# Patient Record
Sex: FEMALE | Race: WHITE | NOT HISPANIC OR LATINO | Employment: OTHER | URBAN - METROPOLITAN AREA
[De-identification: names, ages, dates, MRNs, and addresses within clinical notes are randomized per-mention and may not be internally consistent; named-entity substitution may affect disease eponyms.]

---

## 2021-03-19 ENCOUNTER — IMMUNIZATION (OUTPATIENT)
Dept: FAMILY MEDICINE | Facility: CLINIC | Age: 31
End: 2021-03-19

## 2021-03-19 DIAGNOSIS — Z23 NEED FOR VACCINATION: Primary | ICD-10-CM

## 2021-03-19 PROCEDURE — 91300 COVID-19, MRNA, LNP-S, PF, 30 MCG/0.3 ML DOSE VACCINE: CPT | Mod: ,,, | Performed by: FAMILY MEDICINE

## 2021-03-19 PROCEDURE — 0001A COVID-19, MRNA, LNP-S, PF, 30 MCG/0.3 ML DOSE VACCINE: ICD-10-PCS | Mod: CV19,,, | Performed by: FAMILY MEDICINE

## 2021-03-19 PROCEDURE — 91300 COVID-19, MRNA, LNP-S, PF, 30 MCG/0.3 ML DOSE VACCINE: ICD-10-PCS | Mod: ,,, | Performed by: FAMILY MEDICINE

## 2021-03-19 PROCEDURE — 0001A COVID-19, MRNA, LNP-S, PF, 30 MCG/0.3 ML DOSE VACCINE: CPT | Mod: CV19,,, | Performed by: FAMILY MEDICINE

## 2021-04-09 ENCOUNTER — IMMUNIZATION (OUTPATIENT)
Dept: FAMILY MEDICINE | Facility: CLINIC | Age: 31
End: 2021-04-09

## 2021-04-09 DIAGNOSIS — Z23 NEED FOR VACCINATION: Primary | ICD-10-CM

## 2021-04-09 PROCEDURE — 91300 COVID-19, MRNA, LNP-S, PF, 30 MCG/0.3 ML DOSE VACCINE: ICD-10-PCS | Mod: ,,, | Performed by: FAMILY MEDICINE

## 2021-04-09 PROCEDURE — 0002A COVID-19, MRNA, LNP-S, PF, 30 MCG/0.3 ML DOSE VACCINE: ICD-10-PCS | Mod: CV19,,, | Performed by: FAMILY MEDICINE

## 2021-04-09 PROCEDURE — 91300 COVID-19, MRNA, LNP-S, PF, 30 MCG/0.3 ML DOSE VACCINE: CPT | Mod: ,,, | Performed by: FAMILY MEDICINE

## 2021-04-09 PROCEDURE — 0002A COVID-19, MRNA, LNP-S, PF, 30 MCG/0.3 ML DOSE VACCINE: CPT | Mod: CV19,,, | Performed by: FAMILY MEDICINE

## 2021-09-17 PROBLEM — R11.0 NAUSEOUS: Status: ACTIVE | Noted: 2021-09-17

## 2023-01-11 PROBLEM — R13.14 PHARYNGOESOPHAGEAL DYSPHAGIA: Status: ACTIVE | Noted: 2023-01-11

## 2023-01-12 PROBLEM — F41.9 ANXIETY: Status: ACTIVE | Noted: 2023-01-12

## 2023-01-12 PROBLEM — F50.82 AVOIDANT-RESTRICTIVE FOOD INTAKE DISORDER (ARFID): Status: ACTIVE | Noted: 2023-01-12

## 2023-01-12 PROBLEM — R11.2 NAUSEA AND VOMITING: Status: ACTIVE | Noted: 2021-09-17

## 2023-01-12 PROBLEM — E07.9 THYROID MASS: Status: ACTIVE | Noted: 2023-01-12

## 2023-01-12 PROBLEM — D64.9 ANEMIA: Status: ACTIVE | Noted: 2023-01-12

## 2023-01-13 PROBLEM — D50.9 MICROCYTIC ANEMIA: Status: ACTIVE | Noted: 2023-01-12

## 2023-02-01 PROBLEM — R59.1 LYMPHADENOPATHY: Status: ACTIVE | Noted: 2023-02-01

## 2023-02-01 PROBLEM — C73: Status: ACTIVE | Noted: 2023-02-01

## 2023-02-07 ENCOUNTER — TELEPHONE (OUTPATIENT)
Dept: ENDOCRINOLOGY | Facility: CLINIC | Age: 33
End: 2023-02-07

## 2023-02-07 NOTE — TELEPHONE ENCOUNTER
Pt had surgery on 2/1  Referring [facility] Physician wants 2 week s/p thyroidectomy with Endocrinology  Advised Pauline, , that your next new pt appt available to her is Aug  They will be looking on [place] and provided Pauline with Resource number as well to assist with Medicaid new pt appts and placed on waiting list    Pauline will cont to search for sooner 2 week f/u but asking for guidance if cannot find sooner appt

## 2023-02-07 NOTE — TELEPHONE ENCOUNTER
----- Message from Cris Pichardo sent at 2/7/2023  9:22 AM CST -----  Contact: Pauline rogel/ENZO  Patient needs a [facility] 2 wk f/u appt call back Pauline at [phone] or the patient at [phone] and thanks

## 2023-02-22 ENCOUNTER — OFFICE VISIT (OUTPATIENT)
Dept: ENDOCRINOLOGY | Facility: CLINIC | Age: 33
End: 2023-02-22
Payer: MEDICAID

## 2023-02-22 VITALS
DIASTOLIC BLOOD PRESSURE: 64 MMHG | BODY MASS INDEX: 24.47 KG/M2 | WEIGHT: 155.88 LBS | HEART RATE: 98 BPM | SYSTOLIC BLOOD PRESSURE: 110 MMHG | HEIGHT: 67 IN | OXYGEN SATURATION: 99 %

## 2023-02-22 DIAGNOSIS — E03.9 HYPOTHYROIDISM, UNSPECIFIED TYPE: ICD-10-CM

## 2023-02-22 DIAGNOSIS — C73 THYROID CANCER: Primary | ICD-10-CM

## 2023-02-22 DIAGNOSIS — E83.51 HYPOCALCEMIA: ICD-10-CM

## 2023-02-22 PROCEDURE — 1159F MED LIST DOCD IN RCRD: CPT | Mod: CPTII,,, | Performed by: INTERNAL MEDICINE

## 2023-02-22 PROCEDURE — 3008F PR BODY MASS INDEX (BMI) DOCUMENTED: ICD-10-PCS | Mod: CPTII,,, | Performed by: INTERNAL MEDICINE

## 2023-02-22 PROCEDURE — 99204 OFFICE O/P NEW MOD 45 MIN: CPT | Mod: S$PBB,,, | Performed by: INTERNAL MEDICINE

## 2023-02-22 PROCEDURE — 1111F DSCHRG MED/CURRENT MED MERGE: CPT | Mod: CPTII,,, | Performed by: INTERNAL MEDICINE

## 2023-02-22 PROCEDURE — 99214 OFFICE O/P EST MOD 30 MIN: CPT | Mod: PBBFAC,PO | Performed by: INTERNAL MEDICINE

## 2023-02-22 PROCEDURE — 3078F DIAST BP <80 MM HG: CPT | Mod: CPTII,,, | Performed by: INTERNAL MEDICINE

## 2023-02-22 PROCEDURE — 3074F SYST BP LT 130 MM HG: CPT | Mod: CPTII,,, | Performed by: INTERNAL MEDICINE

## 2023-02-22 PROCEDURE — 1160F PR REVIEW ALL MEDS BY PRESCRIBER/CLIN PHARMACIST DOCUMENTED: ICD-10-PCS | Mod: CPTII,,, | Performed by: INTERNAL MEDICINE

## 2023-02-22 PROCEDURE — 3008F BODY MASS INDEX DOCD: CPT | Mod: CPTII,,, | Performed by: INTERNAL MEDICINE

## 2023-02-22 PROCEDURE — 99999 PR PBB SHADOW E&M-EST. PATIENT-LVL IV: CPT | Mod: PBBFAC,,, | Performed by: INTERNAL MEDICINE

## 2023-02-22 PROCEDURE — 3078F PR MOST RECENT DIASTOLIC BLOOD PRESSURE < 80 MM HG: ICD-10-PCS | Mod: CPTII,,, | Performed by: INTERNAL MEDICINE

## 2023-02-22 PROCEDURE — 1111F PR DISCHARGE MEDS RECONCILED W/ CURRENT OUTPATIENT MED LIST: ICD-10-PCS | Mod: CPTII,,, | Performed by: INTERNAL MEDICINE

## 2023-02-22 PROCEDURE — 1159F PR MEDICATION LIST DOCUMENTED IN MEDICAL RECORD: ICD-10-PCS | Mod: CPTII,,, | Performed by: INTERNAL MEDICINE

## 2023-02-22 PROCEDURE — 99999 PR PBB SHADOW E&M-EST. PATIENT-LVL IV: ICD-10-PCS | Mod: PBBFAC,,, | Performed by: INTERNAL MEDICINE

## 2023-02-22 PROCEDURE — 99204 PR OFFICE/OUTPT VISIT, NEW, LEVL IV, 45-59 MIN: ICD-10-PCS | Mod: S$PBB,,, | Performed by: INTERNAL MEDICINE

## 2023-02-22 PROCEDURE — 3074F PR MOST RECENT SYSTOLIC BLOOD PRESSURE < 130 MM HG: ICD-10-PCS | Mod: CPTII,,, | Performed by: INTERNAL MEDICINE

## 2023-02-22 PROCEDURE — 1160F RVW MEDS BY RX/DR IN RCRD: CPT | Mod: CPTII,,, | Performed by: INTERNAL MEDICINE

## 2023-02-22 NOTE — PROGRESS NOTES
CHIEF COMPLAINT:  Papillary thyroid cancer  32 y.o. old being seen as a new patient.  Here with mother.  Status post thyroidectomy.  Currently on Synthroid 125 mcg daily.  Currently on Tums 1 g 3 times daily.  Also on calcitriol 0.5 mcg b.i.d..  Had a CT showing multiple lymph nodes.  Patient had FNA suggestive of Hurthle cell neoplasm.  Subsequently had a thyroidectomy on 02/01/2023. She did have LN involvement and strap muscle muscle involvement. States that has difficulty swallowing. Having to crush synthroid and place in a little bit of apple sauce. States that had an EGD with dilation. On liquid calcitriol. Taking LT4 by itself. No XRt to head/neck. No palpitations. No tremors. She has a history of panic attacks. No increase from before. No cramping. Tums decreased based on 2/17/23 labs.       FNA 01/18/2023  RIGHT NECK FINE NEEDLE ASPIRATE   - BLOOD ONLY.     RIGHT THYROID FINE NEEDLE ASPIRATE:     - SUSPICIOUS FOR A FOLLICULAR NEOPLASM:  HURTHLE CELL TYPE.         1.  THYROID GLAND, RIGHT LOBE, LOBECTOMY:        --PAPILLARY THYROID CARCINOMA, CLASSIC (USUAL, CONVENTIONAL) TYPE.        --TUMOR MEASURES 1.6 CENTIMETERS.        --FOCAL LYMPHATIC INVASION IDENTIFIED.        --EXTRATHYROIDAL EXTENSION IDENTIFIED.        --MARGINS ARE NEGATIVE FOR MALIGNANCY.   --BENIGN SUBCAPSULAR/INTRACAPSULAR PARATHYROID GLANDULAR TISSUE    IDENTIFIED.          2.  THYROID GLAND, LEFT LOBE, LOBECTOMY:        --PAPILLARY THYROID CARCINOMA, CLASSIC (USUAL, CONVENTIONAL) TYPE.        --TUMOR MEASURES 1.2 CENTIMETERS.        --FOCAL LYMPHATIC INVASION IDENTIFIED.        --NO EXTRATHYROIDAL EXTENSION IDENTIFIED.        --MARGINS ARE NEGATIVE FOR MALIGNANCY.   --ONE PERITHYROIDAL SPECIMEN LYMPH NODE IS POSITIVE FOR METASTATIC    PAPILLARY THYROID CARCINOMA   (1/1); SIZE OF PART 1 SPECIMEN METASTATIC DEPOSIT: APPROXIMATELY 0.1    CENTIMETER; NO UNEQUIVOCAL           EXTRANODAL EXTENSION IDENTIFIED.     3.  LEFT CENTRAL NECK,  EXCISION:   --NINE OUT OF TWELVE LYMPH NODES ARE POSITIVE FOR METASTATIC PAPILLARY    THYROID CARCINOMA (9/12).   --SIZE OF LARGEST PART 3 SPECIMEN METASTATIC DEPOSIT: APPROXIMATELY 0.6    CENTIMETER.        --FOCAL EXTRANODAL EXTENSION IDENTIFIED.        --THYROID PARENCHYMA WITH NODULAR HYPERPLASIA.        --BENIGN PARATHYROID GLANDULAR TISSUE IDENTIFIED.     4.  RIGHT PARATRACHEAL, EXCISION:   --SIX OUT OF TEN LYMPH NODES ARE POSITIVE FOR METASTATIC PAPILLARY    THYROID CARCINOMA (6/10).   --SIZE OF LARGEST PART 4 SPECIMEN METASTATIC DEPOSIT: APPROXIMATELY 0.7    CENTIMETER.        --NO UNEQUIVOCAL EXTRANODAL EXTENSION IDENTIFIED.        --BENIGN ECTOPIC THYMIC TISSUE IDENTIFIED.     5.  RIGHT NECK CONTENTS, DISSECTION:   --THIRTEEN OUT OF FORTY LYMPH NODES ARE POSITIVE FOR METASTATIC    PAPILLARY THYROID CARCINOMA           (13/40).   --SIZE OF LARGEST PART 5 SPECIMEN METASTATIC DEPOSIT: APPROXIMATELY 1.7    CENTIMETERS.        --EXTRANODAL EXTENSION (MULTIFOCAL) IDENTIFIED.     6.  LEFT NECK CONTENTS, DISSECTION:   --ONE OUT OF EIGHTEEN LYMPH NODES IS POSITIVE FOR METASTATIC PAPILLARY    THYROID CARCINOMA (1/18).   --SIZE OF LARGEST PART 6 SPECIMEN METASTATIC DEPOSIT: LESS THAN 0.1    CENTIMETER.        --NO UNEQUIVOCAL EXTRANODAL EXTENSION IDENTIFIED.        --BENIGN PARATHYROID GLANDULAR TISSUE IDENTIFIED.       PAST MEDICAL HISTORY/PAST SURGICAL HISTORY:  Reviewed in [facility]    SOCIAL HISTORY: Reviewed in [facility]    FAMILY HISTORY:  Father has thyroid nodules and awaiting biopsy. Father with prediabetes.     MEDICATIONS/ALLERGIES: The patient's MedCard has been updated and reviewed.            PE:    GENERAL: Well developed, well nourished.  NECK: Supple, trachea midline, surgical scar intact.  CHEST: Resp even and unlabored, CTA bilateral.  CARDIAC: RRR, S1, S2 heard, no murmurs, rubs, S3, or S4  SKIN: no acanthosis nigracans.    LABS/Radiology     Latest Reference Range & Units 02/09/23 06:17 02/14/23 14:38  02/17/23 15:22   Sodium 136 - 145 mmol/L 137 139    Potassium 3.5 - 5.1 mmol/L 3.7 4.3    Chloride 95 - 110 mmol/L 102 102    CO2 22 - 31 mmol/L 31 30    Anion Gap 8 - 16 mmol/L 4 (L) 7 (L)    BUN 7 - 18 mg/dL 5 (L) 12    Creatinine 0.50 - 1.40 mg/dL 0.80 0.88    eGFR >60 mL/min/1.73 m^2 >60 >60    Glucose 70 - 110 mg/dL 99 104    Calcium 8.4 - 10.2 mg/dL  8.4 - 10.2 mg/dL 8.7 10.2  10.2    Ionized Calcium 1.11 - 1.30 mmol/L   1.34 (H)   CALCIUM, IONIZED AT PH 7.4 SER 1.11 - 1.30 mmol/L   1.39 (H)   Magnesium 1.6 - 2.6 mg/dL  1.9    Alkaline Phosphatase 38 - 145 U/L  83    PROTEIN TOTAL 6.0 - 8.4 g/dL  6.8    Albumin 3.5 - 5.2 g/dL  4.2    (L): Data is abnormally low  (H): Data is abnormally high     Latest Reference Range & Units 02/08/23 07:53 02/14/23 14:38   PTH 9.0 - 77.0 pg/mL <3.4 (L) <3.4 (L)   (L): Data is abnormally low    ASSESSMENT/PLAN:  Papillary thyroid cancer-multifocal.  Multiple level lymph node involvement.  Also involvement of strap muscle.  Status post thyroidectomy with bilateral neck dissection.  Reviewed pathology report with patient.  Discussed usual treatment for thyroid cancer versus other types of cancers.  She will need radioactive iodine.  Will assess for dosage of radioactive iodine after we check her thyroglobulin in 2 weeks.  May need higher dose due to multiple level lymph node involvement as well as strap muscle involvement.  Will keep TSH suppressed.  However, she does have anxiety and will need to watch for worsening of anxiety.  Discussed taking thyroid medication by itself.  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  States she is seeing GI.  Taking L T4 with some small amount of applesauce.    2.  Hypothyroidism-see #1.  Monitor closely for hyperthyroid symptoms since will be keeping TSH suppressed.    3. Hypocalcemia-last PTH undetectable.  Repeat in 2 weeks.  Currently on liquid calcitriol.  Last calcium was elevated and Tums decreased.  No symptoms of  hypocalcemia.      FOLLOWUP  2 weeks- TSH, Tg, Renal Panel, PTH  F/U 6 months   Info on low iodine

## 2023-03-03 ENCOUNTER — PATIENT MESSAGE (OUTPATIENT)
Dept: ENDOCRINOLOGY | Facility: CLINIC | Age: 33
End: 2023-03-03

## 2023-03-07 ENCOUNTER — PATIENT MESSAGE (OUTPATIENT)
Dept: ENDOCRINOLOGY | Facility: CLINIC | Age: 33
End: 2023-03-07

## 2023-03-07 DIAGNOSIS — N28.9 KIDNEY FUNCTION ABNORMAL: Primary | ICD-10-CM

## 2023-03-07 RX ORDER — CALCIUM CARBONATE 200(500)MG
1 TABLET,CHEWABLE ORAL 2 TIMES DAILY
Qty: 60 TABLET | Refills: 11 | COMMUNITY
Start: 2023-03-07 | End: 2024-03-06

## 2023-03-07 NOTE — TELEPHONE ENCOUNTER
Last Ca was elevated  Decrease Tums to 1 tablet BID  Stay on calcitriol  Check Renal Panel 1 week    Will discuss with nuclear med on LEYVA dose

## 2023-03-14 ENCOUNTER — PATIENT MESSAGE (OUTPATIENT)
Dept: ENDOCRINOLOGY | Facility: CLINIC | Age: 33
End: 2023-03-14

## 2023-03-14 DIAGNOSIS — E83.51 HYPOCALCEMIA: Primary | ICD-10-CM

## 2023-03-30 ENCOUNTER — TELEPHONE (OUTPATIENT)
Dept: ENDOCRINOLOGY | Facility: CLINIC | Age: 33
End: 2023-03-30

## 2023-03-31 NOTE — TELEPHONE ENCOUNTER
Can you see if she is still taking in anything orally.    Need to see how we can get radioactive iodine.

## 2023-04-05 ENCOUNTER — TELEPHONE (OUTPATIENT)
Dept: ENDOCRINOLOGY | Facility: CLINIC | Age: 33
End: 2023-04-05

## 2023-04-05 NOTE — TELEPHONE ENCOUNTER
----- Message from Kailee Trejo sent at 4/5/2023 11:20 AM CDT -----  Regarding: pt call back  Type:  Patient Returning Call         Who Called: Mother of pt          Who Left Message for Patient: Millie Kincaid LPN         Does the patient know what this is regarding? Results          Best Call Back Number: [phone]           Additional Information:

## 2023-04-10 ENCOUNTER — TELEPHONE (OUTPATIENT)
Dept: ENDOCRINOLOGY | Facility: CLINIC | Age: 33
End: 2023-04-10

## 2023-04-10 DIAGNOSIS — E83.51 HYPOCALCEMIA: Primary | ICD-10-CM

## 2023-04-10 NOTE — TELEPHONE ENCOUNTER
----- Message from Selena Rondon sent at 4/10/2023 12:06 PM CDT -----  Name of Who is Calling:JAVED MOSES [[id number]], Gio , mother        What is the request in detail: pt had blood work done last week and mother has not heard back from anyone yet, stated that she has been waiting to know when she will be starting radiation, would like to know if pt should stay on same dosage of medication due to no call back        Can the clinic reply by [facility]: no        [facility] [phone]

## 2023-04-11 NOTE — TELEPHONE ENCOUNTER
Was finally able to reach mother, advised of your message below.  She verb understanding.  Will repeat labs in 2 weeks at [facility].      She says pt will only take pills/meds crushed and mixed in apple sauce or yogurt.  Please advise.

## 2023-04-11 NOTE — TELEPHONE ENCOUNTER
Thyroglobulin not significantly elevated.  Would ideally like to do radioactive iodine.  Ideally should be given orally.  Can you see if she is taking any medications orally     Calcium currently within normal limits with medication.  Stay on same doses    In 2 weeks check a renal panel and PTH

## 2023-04-19 NOTE — TELEPHONE ENCOUNTER
"Please see her mother's msg from last week.  "She says pt will only take pills/meds crushed and mixed in apple sauce or yogurt".  Please advise.   "

## 2023-04-19 NOTE — TELEPHONE ENCOUNTER
"Spoke w/ pt's mother, she has only been able to swallow a "tiny nexium capsule" twice since the weekend, cannot swallow the levothyroxine.  Pt is trying and really wants to be able to move forward with the LEYVA but they feel she will need "at least another week of practicing swallowing."  Please advise.   "

## 2023-04-27 ENCOUNTER — PATIENT MESSAGE (OUTPATIENT)
Dept: ENDOCRINOLOGY | Facility: CLINIC | Age: 33
End: 2023-04-27

## 2023-04-28 ENCOUNTER — PATIENT MESSAGE (OUTPATIENT)
Dept: ENDOCRINOLOGY | Facility: CLINIC | Age: 33
End: 2023-04-28

## 2023-04-28 RX ORDER — CALCITRIOL 1 UG/ML
1 SOLUTION ORAL DAILY
COMMUNITY
Start: 2023-03-22 | End: 2023-04-28 | Stop reason: SDUPTHER

## 2023-04-28 RX ORDER — CALCITRIOL 1 UG/ML
1 SOLUTION ORAL DAILY
Qty: 30 ML | Refills: 11 | Status: SHIPPED | OUTPATIENT
Start: 2023-04-28 | End: 2023-05-09 | Stop reason: SDUPTHER

## 2023-05-04 ENCOUNTER — TELEPHONE (OUTPATIENT)
Dept: ENDOCRINOLOGY | Facility: CLINIC | Age: 33
End: 2023-05-04
Payer: MEDICAID

## 2023-05-04 NOTE — TELEPHONE ENCOUNTER
----- Message from Rafa Jordan sent at 5/4/2023  9:46 AM CDT -----  Type: Needs Medical Advice  Who Called:  Pt's mother Gio  Best Call Back Number: [phone]  Additional Information: pt's mother stated she is concerned that the pt's ins end June 1st and the pt needs her radiation. She would like to discuss her concerns with someone in the office asap. Please call back to advise asap, thanks!

## 2023-05-04 NOTE — TELEPHONE ENCOUNTER
LM returning pt's mother's call and adv we are still waiting to hear back about RA and Dr Cardenas is not in clinic until Mon.

## 2023-05-09 RX ORDER — CALCITRIOL 1 UG/ML
1 SOLUTION ORAL DAILY
Qty: 30 ML | Refills: 11 | Status: SHIPPED | OUTPATIENT
Start: 2023-05-09 | End: 2024-02-19

## 2023-05-16 ENCOUNTER — PATIENT MESSAGE (OUTPATIENT)
Dept: ENDOCRINOLOGY | Facility: CLINIC | Age: 33
End: 2023-05-16
Payer: MEDICAID

## 2023-05-18 ENCOUNTER — PATIENT MESSAGE (OUTPATIENT)
Dept: ENDOCRINOLOGY | Facility: CLINIC | Age: 33
End: 2023-05-18
Payer: MEDICAID

## 2023-05-31 ENCOUNTER — OFFICE VISIT (OUTPATIENT)
Dept: ENDOCRINOLOGY | Facility: CLINIC | Age: 33
End: 2023-05-31
Payer: MEDICAID

## 2023-05-31 VITALS
HEIGHT: 67 IN | SYSTOLIC BLOOD PRESSURE: 128 MMHG | BODY MASS INDEX: 25.17 KG/M2 | DIASTOLIC BLOOD PRESSURE: 86 MMHG | WEIGHT: 160.38 LBS

## 2023-05-31 DIAGNOSIS — C73 PRIMARY THYROID MALIGNANCY: Primary | ICD-10-CM

## 2023-05-31 PROCEDURE — 99214 PR OFFICE/OUTPT VISIT, EST, LEVL IV, 30-39 MIN: ICD-10-PCS | Mod: S$PBB,,, | Performed by: INTERNAL MEDICINE

## 2023-05-31 PROCEDURE — 99999 PR PBB SHADOW E&M-EST. PATIENT-LVL III: CPT | Mod: PBBFAC,,, | Performed by: INTERNAL MEDICINE

## 2023-05-31 PROCEDURE — 99213 OFFICE O/P EST LOW 20 MIN: CPT | Mod: PBBFAC | Performed by: INTERNAL MEDICINE

## 2023-05-31 PROCEDURE — 99999 PR PBB SHADOW E&M-EST. PATIENT-LVL III: ICD-10-PCS | Mod: PBBFAC,,, | Performed by: INTERNAL MEDICINE

## 2023-05-31 PROCEDURE — 99214 OFFICE O/P EST MOD 30 MIN: CPT | Mod: S$PBB,,, | Performed by: INTERNAL MEDICINE

## 2023-05-31 NOTE — PROGRESS NOTES
Ms. Johnston is a 32 year-old female with multifocal bilateral papillary thyroid cancer (classic variant with ETE). She underwent total thyroidectomy and  bilateral central and lateral neck dissection with Dr. Weaver on 2/1/2023.  30/81 LN positive with MYRIAM present. LÓPEZ high risk for recurrence.       She is accompanied by her parents for this consultation.     Informed consent for treatment was obtained verbally.  The indication for, risks and benefits of, and alternatives to, treatment with radioactive iodine were reviewed with the patient.  Specifically, the risks for nausea and vomiting, temporary or permanent injury to the salivary glands, and secondary malignancies such as leukemia were discussed.  All questions were answered to her satisfaction, and she would like to proceed with treatment.     The importance of preparation with the low iodine diet was reviewed, and the principles of radiation safety precaution were also reviewed.      She has no special medical needs, and her home and travel situations are appropriate.     Impression/Plan:  Ms. Johnston is a year-old woman with papillary thyroid cancer, and she is an appropriate candidate for [post-surgical adjuvant treatment of thyroid cancer with I-131 on an outpatient basis.       Given the status/extent of her disease, an administered activity of 100 mCi of I-131 would be appropriate.  Written radiation safety precautions will be provided, and the informed consent form can be signed on the date of treatment.     A working schedule is as follows:    Wednesday, date:  Begin low iodine diet  Monday, date: Receive 1st Thyrogen injection at Dr. Cardenas's office followed by serum HCG  Tuesday, date: Receive 2nd Thyrogen injection followed by TSH and thyroglobulin  Wednesday, date: Treatment with 100 mCi I-131 at [facility]  Saturday, date:  Resume usual diet  Wednesday, 1 week later: Undergo post-treatment whole body scan at Nuclear Medicine    I  will contact her with finalized dates. Will plan to have her do labs and Thyrogen with Dr. Cardenas's office.       Checklist:  [x]    Written Directive - to be completed when patient arrives for treatment  [x]    Screening worksheet for outpatient treatment  [x]    Patient radiation safety instructions  [x]    Record of patient release (<220 mCi for thyroidectomy patients)  [x]    Informed consent  [x]    Pregnancy and breast feeding screen (order serum hCG within 48 hours of treatment if female <55 years old and without hysterectomy)  [x]    Low iodine diet information provided      A total of 55 minutes was spent on this visit, which includes: preparing to see the patient, obtaining history, performing a medically appropriate exam, counseling, ordering medications, tests, and/or procedures, and documenting the clinical information in the EHR.                   Matthew Coker M.D. Staff Endocrinology

## 2023-06-05 ENCOUNTER — TELEPHONE (OUTPATIENT)
Dept: ENDOCRINOLOGY | Facility: CLINIC | Age: 33
End: 2023-06-05
Payer: COMMERCIAL

## 2023-06-05 DIAGNOSIS — C73 THYROID CANCER: Primary | ICD-10-CM

## 2023-06-05 DIAGNOSIS — C73 PRIMARY THYROID MALIGNANCY: Primary | ICD-10-CM

## 2023-06-05 NOTE — TELEPHONE ENCOUNTER
----- Message from Matthew Coker MD sent at 6/5/2023  1:00 PM CDT -----  Regarding: RE: LEYVA treatment  Alan, is the size of the regular LEYVA capsule roughly equivalent to an ibuprofen liquid capsule? Her mom says she was able to swallow that. I just want to make sure there aren't any surprises on the treatment day.   ----- Message -----  From: Abhinav Andrews RT  Sent: 6/5/2023  12:59 PM CDT  To: Haider Cardenas DO, Matthew Coker MD, #  Subject: RE: LEYVA treatment                                Patient has been scheduled for Treatment and one week post WB scan. Dose has been ordered, we are unable to order form [facility] (as per hospital pharmacy)..  ----- Message -----  From: Matthew Coker MD  Sent: 6/5/2023   7:15 AM CDT  To: Haider Cardenas DO, Abhinav Andrews, RT, #  Subject: LEYVA treatment                                    Hello,     I would like to treat Ms. Johnston with LEYVA on Wed June 21st at 10 or 10:30 AM. She would like to get Thyrogen and her pretreatment labs on the [facility] if possible.     Her schedule should look like this (I'll put all the orders in)    Thyrogen 1 and Serum HCG June 19th ([facility])  Thyrogen 2 and TSH/thyroglobulin after second Thyrogen June 20th ([facility])  100 mCi of LEYVA June 21st ([facility])  Whole body scan June 28th ([facility])      Dee Dee, can you let me know when her treatment and whole body scan are scheduled. Also, Dr. Mir had mentioned possibly ordering her LEYVA from [facility] so she can have a smaller pill to swallow.     Thanks,   Matthew

## 2023-06-07 ENCOUNTER — PATIENT MESSAGE (OUTPATIENT)
Dept: ENDOCRINOLOGY | Facility: CLINIC | Age: 33
End: 2023-06-07
Payer: COMMERCIAL

## 2023-06-08 NOTE — TELEPHONE ENCOUNTER
Called patient's mother. She had questions about any anticipated side effects after LEYVA that could cause significant issue or cause her to have to bring her daughter to the ER. I advised her that I don't anticipate any side effects that would require an ER visit or urgent intervention. Advised her the most likely side effect would be salivary gland tenderness which could be managed with NSAID's, Tylenol and oral hydration and should not require an ER visit. Some nausea or GI upset is also possible and should be treated at home with anti-emetics and oral hydration.

## 2023-06-19 ENCOUNTER — LAB VISIT (OUTPATIENT)
Dept: LAB | Facility: HOSPITAL | Age: 33
End: 2023-06-19
Attending: INTERNAL MEDICINE
Payer: COMMERCIAL

## 2023-06-19 ENCOUNTER — CLINICAL SUPPORT (OUTPATIENT)
Dept: ENDOCRINOLOGY | Facility: CLINIC | Age: 33
End: 2023-06-19
Payer: COMMERCIAL

## 2023-06-19 DIAGNOSIS — C73 PRIMARY THYROID MALIGNANCY: ICD-10-CM

## 2023-06-19 DIAGNOSIS — C73 THYROID CANCER: Primary | ICD-10-CM

## 2023-06-19 LAB — HCG INTACT+B SERPL-ACNC: <2.4 MIU/ML

## 2023-06-19 PROCEDURE — 84702 CHORIONIC GONADOTROPIN TEST: CPT | Performed by: INTERNAL MEDICINE

## 2023-06-19 PROCEDURE — 36415 COLL VENOUS BLD VENIPUNCTURE: CPT | Mod: PO | Performed by: INTERNAL MEDICINE

## 2023-06-19 PROCEDURE — 99211 OFF/OP EST MAY X REQ PHY/QHP: CPT | Mod: PBBFAC,PO

## 2023-06-19 PROCEDURE — 99999 PR PBB SHADOW E&M-EST. PATIENT-LVL I: ICD-10-PCS | Mod: PBBFAC,,,

## 2023-06-19 PROCEDURE — 96372 PR INJECTION,THERAP/PROPH/DIAG2ST, IM OR SUBCUT: ICD-10-PCS | Mod: S$GLB,,, | Performed by: INTERNAL MEDICINE

## 2023-06-19 PROCEDURE — 96372 THER/PROPH/DIAG INJ SC/IM: CPT | Mod: S$GLB,,, | Performed by: INTERNAL MEDICINE

## 2023-06-19 PROCEDURE — 99999 PR PBB SHADOW E&M-EST. PATIENT-LVL I: CPT | Mod: PBBFAC,,,

## 2023-06-20 ENCOUNTER — CLINICAL SUPPORT (OUTPATIENT)
Dept: ENDOCRINOLOGY | Facility: CLINIC | Age: 33
End: 2023-06-20
Payer: COMMERCIAL

## 2023-06-20 ENCOUNTER — LAB VISIT (OUTPATIENT)
Dept: LAB | Facility: HOSPITAL | Age: 33
End: 2023-06-20
Attending: INTERNAL MEDICINE
Payer: COMMERCIAL

## 2023-06-20 DIAGNOSIS — C73 PRIMARY THYROID MALIGNANCY: Primary | ICD-10-CM

## 2023-06-20 DIAGNOSIS — C73 PRIMARY THYROID MALIGNANCY: ICD-10-CM

## 2023-06-20 DIAGNOSIS — D50.9 MICROCYTIC ANEMIA: Primary | ICD-10-CM

## 2023-06-20 DIAGNOSIS — D50.9 MICROCYTIC ANEMIA: ICD-10-CM

## 2023-06-20 LAB
BASOPHILS # BLD AUTO: 0.03 K/UL (ref 0–0.2)
BASOPHILS NFR BLD: 0.7 % (ref 0–1.9)
DIFFERENTIAL METHOD: ABNORMAL
EOSINOPHIL # BLD AUTO: 0.1 K/UL (ref 0–0.5)
EOSINOPHIL NFR BLD: 1.8 % (ref 0–8)
ERYTHROCYTE [DISTWIDTH] IN BLOOD BY AUTOMATED COUNT: 12.4 % (ref 11.5–14.5)
HCT VFR BLD AUTO: 35.3 % (ref 37–48.5)
HGB BLD-MCNC: 11.7 G/DL (ref 12–16)
IMM GRANULOCYTES # BLD AUTO: 0.01 K/UL (ref 0–0.04)
IMM GRANULOCYTES NFR BLD AUTO: 0.2 % (ref 0–0.5)
LYMPHOCYTES # BLD AUTO: 1.5 K/UL (ref 1–4.8)
LYMPHOCYTES NFR BLD: 33.9 % (ref 18–48)
MCH RBC QN AUTO: 28.5 PG (ref 27–31)
MCHC RBC AUTO-ENTMCNC: 33.1 G/DL (ref 32–36)
MCV RBC AUTO: 86 FL (ref 82–98)
MONOCYTES # BLD AUTO: 0.4 K/UL (ref 0.3–1)
MONOCYTES NFR BLD: 8 % (ref 4–15)
NEUTROPHILS # BLD AUTO: 2.4 K/UL (ref 1.8–7.7)
NEUTROPHILS NFR BLD: 55.4 % (ref 38–73)
NRBC BLD-RTO: 0 /100 WBC
PLATELET # BLD AUTO: 280 K/UL (ref 150–450)
PMV BLD AUTO: 10.3 FL (ref 9.2–12.9)
RBC # BLD AUTO: 4.11 M/UL (ref 4–5.4)
T4 FREE SERPL-MCNC: 1.54 NG/DL (ref 0.71–1.51)
TSH SERPL DL<=0.005 MIU/L-ACNC: 156.31 UIU/ML (ref 0.4–4)
WBC # BLD AUTO: 4.4 K/UL (ref 3.9–12.7)

## 2023-06-20 PROCEDURE — 84432 ASSAY OF THYROGLOBULIN: CPT | Performed by: INTERNAL MEDICINE

## 2023-06-20 PROCEDURE — 84439 ASSAY OF FREE THYROXINE: CPT | Performed by: INTERNAL MEDICINE

## 2023-06-20 PROCEDURE — 96372 PR INJECTION,THERAP/PROPH/DIAG2ST, IM OR SUBCUT: ICD-10-PCS | Mod: S$GLB,,, | Performed by: INTERNAL MEDICINE

## 2023-06-20 PROCEDURE — 85025 COMPLETE CBC W/AUTO DIFF WBC: CPT | Performed by: INTERNAL MEDICINE

## 2023-06-20 PROCEDURE — 36415 COLL VENOUS BLD VENIPUNCTURE: CPT | Mod: PO | Performed by: INTERNAL MEDICINE

## 2023-06-20 PROCEDURE — 84443 ASSAY THYROID STIM HORMONE: CPT | Performed by: INTERNAL MEDICINE

## 2023-06-20 PROCEDURE — 96372 THER/PROPH/DIAG INJ SC/IM: CPT | Mod: S$GLB,,, | Performed by: INTERNAL MEDICINE

## 2023-06-21 ENCOUNTER — HOSPITAL ENCOUNTER (OUTPATIENT)
Dept: RADIOLOGY | Facility: HOSPITAL | Age: 33
Discharge: HOME | End: 2023-06-21
Attending: INTERNAL MEDICINE
Payer: COMMERCIAL

## 2023-06-21 ENCOUNTER — PATIENT MESSAGE (OUTPATIENT)
Dept: ENDOCRINOLOGY | Facility: CLINIC | Age: 33
End: 2023-06-21
Payer: COMMERCIAL

## 2023-06-21 DIAGNOSIS — C73 PRIMARY THYROID MALIGNANCY: ICD-10-CM

## 2023-06-21 PROCEDURE — 79005 NM THERAPY BY ORAL ADMINISTRATION: ICD-10-PCS | Mod: 26,,, | Performed by: INTERNAL MEDICINE

## 2023-06-21 PROCEDURE — 79005 NUCLEAR RX ORAL ADMIN: CPT | Mod: TC

## 2023-06-21 PROCEDURE — 79005 NUCLEAR RX ORAL ADMIN: CPT | Mod: 26,,, | Performed by: INTERNAL MEDICINE

## 2023-06-22 ENCOUNTER — PATIENT MESSAGE (OUTPATIENT)
Dept: ENDOCRINOLOGY | Facility: CLINIC | Age: 33
End: 2023-06-22
Payer: COMMERCIAL

## 2023-06-22 LAB
THRYOGLOBULIN INTERPRETATION: ABNORMAL
THYROGLOB AB SERPL-ACNC: <1.8 IU/ML
THYROGLOB SERPL-MCNC: 0.9 NG/ML

## 2023-06-27 ENCOUNTER — PATIENT MESSAGE (OUTPATIENT)
Dept: ENDOCRINOLOGY | Facility: CLINIC | Age: 33
End: 2023-06-27
Payer: COMMERCIAL

## 2023-06-28 ENCOUNTER — HOSPITAL ENCOUNTER (OUTPATIENT)
Dept: RADIOLOGY | Facility: HOSPITAL | Age: 33
Discharge: HOME | End: 2023-06-28
Attending: INTERNAL MEDICINE
Payer: COMMERCIAL

## 2023-06-28 ENCOUNTER — PATIENT MESSAGE (OUTPATIENT)
Dept: ENDOCRINOLOGY | Facility: CLINIC | Age: 33
End: 2023-06-28
Payer: COMMERCIAL

## 2023-06-28 DIAGNOSIS — C73 PRIMARY THYROID MALIGNANCY: ICD-10-CM

## 2023-06-28 PROCEDURE — 78018 THYROID MET IMAGING BODY: CPT | Mod: 26,,, | Performed by: STUDENT IN AN ORGANIZED HEALTH CARE EDUCATION/TRAINING PROGRAM

## 2023-06-28 PROCEDURE — 78018 NM THYROID METASTATIC CANCER WHOLE BODY SCAN: ICD-10-PCS | Mod: 26,,, | Performed by: STUDENT IN AN ORGANIZED HEALTH CARE EDUCATION/TRAINING PROGRAM

## 2023-06-28 PROCEDURE — 78018 THYROID MET IMAGING BODY: CPT | Mod: TC

## 2023-06-29 ENCOUNTER — PATIENT MESSAGE (OUTPATIENT)
Dept: ENDOCRINOLOGY | Facility: CLINIC | Age: 33
End: 2023-06-29
Payer: COMMERCIAL

## 2023-08-03 ENCOUNTER — PATIENT MESSAGE (OUTPATIENT)
Dept: ENDOCRINOLOGY | Facility: CLINIC | Age: 33
End: 2023-08-03
Payer: COMMERCIAL

## 2023-08-03 DIAGNOSIS — E03.9 HYPOTHYROIDISM, UNSPECIFIED TYPE: Primary | ICD-10-CM

## 2023-08-04 ENCOUNTER — NURSE TRIAGE (OUTPATIENT)
Dept: ADMINISTRATIVE | Facility: CLINIC | Age: 33
End: 2023-08-04
Payer: COMMERCIAL

## 2023-08-04 ENCOUNTER — PATIENT MESSAGE (OUTPATIENT)
Dept: ENDOCRINOLOGY | Facility: CLINIC | Age: 33
End: 2023-08-04
Payer: COMMERCIAL

## 2023-08-04 NOTE — TELEPHONE ENCOUNTER
Spoke with mother of pt who states that pt was increased to 137mcg of synthroid from 123mcg. States took 137mcg yesterday, and today. Reports pt complaint of SOB after 2nd dose, but denies any symptoms at this time. States pt had lab drawn today and was told level was low.told not to give 137mcg. Asking if she should give 123 mcg or not give anything at all    Spoke with Floridalma Hirsch PA-C who advised pt can continue to take 125mcg of synthroid until she sees Dr. Cardenas for appointment 8/25    Spoke with mother who verbalized understanding      Reason for Disposition   [1] Caller has URGENT medicine question about med that PCP or specialist prescribed AND [2] triager unable to answer question    Additional Information   Negative: [1] Intentional drug overdose AND [2] suicidal thoughts or ideas   Negative: MORE THAN A DOUBLE DOSE of a prescription or over-the-counter (OTC) drug   Negative: [1] DOUBLE DOSE (an extra dose or lesser amount) of prescription drug AND [2] any symptoms (e.g., dizziness, nausea, pain, sleepiness)   Negative: [1] DOUBLE DOSE (an extra dose or lesser amount) of over-the-counter (OTC) drug AND [2] any symptoms (e.g., dizziness, nausea, pain, sleepiness)   Negative: Took another person's prescription drug   Negative: [1] DOUBLE DOSE (an extra dose or lesser amount) of prescription drug AND [2] NO symptoms  (Exception: A double dose of antibiotics.)   Negative: Diabetes drug error or overdose (e.g., took wrong type of insulin or took extra dose)   Negative: [1] Prescription not at pharmacy AND [2] was prescribed by PCP recently (Exception: Triager has access to EMR and prescription is recorded there. Go to Home Care and confirm for pharmacy.)   Negative: [1] Pharmacy calling with prescription question AND [2] triager unable to answer question    Protocols used: Medication Question Call-A-

## 2023-08-07 ENCOUNTER — TELEPHONE (OUTPATIENT)
Dept: ENDOCRINOLOGY | Facility: CLINIC | Age: 33
End: 2023-08-07
Payer: COMMERCIAL

## 2023-08-07 DIAGNOSIS — E03.9 HYPOTHYROIDISM, UNSPECIFIED TYPE: Primary | ICD-10-CM

## 2023-08-07 NOTE — TELEPHONE ENCOUNTER
Mother insistent that they do another thyroid test before the appointment on the 25 th. Please advise.

## 2023-08-07 NOTE — TELEPHONE ENCOUNTER
Let patient know that Floridalma let me know she spoke with the patient. AGree with Kami recs to stay on on her previous dose of synthroid 125 mcg daily

## 2023-08-07 NOTE — TELEPHONE ENCOUNTER
----- Message from Tremontana Chevalier sent at 8/4/2023  4:42 PM CDT -----  Regarding: RX advice  Name Of Caller:  Elvira Heredia w/[employer] on call  - ext [phone]    Name of Provider:  self     Contact Preference:  trudy oJhnston @ [phone]    Nature of call: caller sys mother of pt needs to know what dosage of cynthroid med to give to pt. Caller sys dose recently changed and not feeling well, wanting to know if she should continue taking the current dosage or return to 123 mcg.     See RX    levothyroxine (SYNTHROID) 137 MCG Tab tablet 30 tablet Sig - Route: Take 1 tablet (137 mcg total) by mouth before breakfast. - Oral    Does patient use [employer]?

## 2023-08-25 ENCOUNTER — LAB VISIT (OUTPATIENT)
Dept: LAB | Facility: HOSPITAL | Age: 33
End: 2023-08-25
Attending: INTERNAL MEDICINE
Payer: COMMERCIAL

## 2023-08-25 ENCOUNTER — OFFICE VISIT (OUTPATIENT)
Dept: ENDOCRINOLOGY | Facility: CLINIC | Age: 33
End: 2023-08-25
Payer: COMMERCIAL

## 2023-08-25 VITALS
DIASTOLIC BLOOD PRESSURE: 70 MMHG | OXYGEN SATURATION: 96 % | BODY MASS INDEX: 25.31 KG/M2 | HEART RATE: 76 BPM | HEIGHT: 67 IN | SYSTOLIC BLOOD PRESSURE: 110 MMHG | WEIGHT: 161.25 LBS

## 2023-08-25 DIAGNOSIS — E03.9 HYPOTHYROIDISM, UNSPECIFIED TYPE: ICD-10-CM

## 2023-08-25 DIAGNOSIS — E83.51 HYPOCALCEMIA: ICD-10-CM

## 2023-08-25 DIAGNOSIS — C73 THYROID CANCER: ICD-10-CM

## 2023-08-25 DIAGNOSIS — E89.0 S/P THYROIDECTOMY: ICD-10-CM

## 2023-08-25 LAB
ALBUMIN SERPL BCP-MCNC: 3.9 G/DL (ref 3.5–5.2)
ANION GAP SERPL CALC-SCNC: 8 MMOL/L (ref 8–16)
BUN SERPL-MCNC: 17 MG/DL (ref 6–20)
CALCIUM SERPL-MCNC: 9.8 MG/DL (ref 8.7–10.5)
CHLORIDE SERPL-SCNC: 104 MMOL/L (ref 95–110)
CO2 SERPL-SCNC: 27 MMOL/L (ref 23–29)
CREAT SERPL-MCNC: 0.8 MG/DL (ref 0.5–1.4)
ERYTHROCYTE [DISTWIDTH] IN BLOOD BY AUTOMATED COUNT: 12.5 % (ref 11.5–14.5)
EST. GFR  (NO RACE VARIABLE): >60 ML/MIN/1.73 M^2
GLUCOSE SERPL-MCNC: 81 MG/DL (ref 70–110)
HCT VFR BLD AUTO: 35.8 % (ref 37–48.5)
HGB BLD-MCNC: 11.7 G/DL (ref 12–16)
MCH RBC QN AUTO: 28.5 PG (ref 27–31)
MCHC RBC AUTO-ENTMCNC: 32.7 G/DL (ref 32–36)
MCV RBC AUTO: 87 FL (ref 82–98)
PHOSPHATE SERPL-MCNC: 4.2 MG/DL (ref 2.7–4.5)
PLATELET # BLD AUTO: 301 K/UL (ref 150–450)
PMV BLD AUTO: 9.4 FL (ref 9.2–12.9)
POTASSIUM SERPL-SCNC: 3.9 MMOL/L (ref 3.5–5.1)
PTH-INTACT SERPL-MCNC: <5 PG/ML (ref 9–77)
RBC # BLD AUTO: 4.11 M/UL (ref 4–5.4)
SODIUM SERPL-SCNC: 139 MMOL/L (ref 136–145)
TSH SERPL DL<=0.005 MIU/L-ACNC: 0.77 UIU/ML (ref 0.4–4)
WBC # BLD AUTO: 5.68 K/UL (ref 3.9–12.7)

## 2023-08-25 PROCEDURE — 3008F BODY MASS INDEX DOCD: CPT | Mod: CPTII,S$GLB,, | Performed by: INTERNAL MEDICINE

## 2023-08-25 PROCEDURE — 99999 PR PBB SHADOW E&M-EST. PATIENT-LVL V: ICD-10-PCS | Mod: PBBFAC,,, | Performed by: INTERNAL MEDICINE

## 2023-08-25 PROCEDURE — 3074F PR MOST RECENT SYSTOLIC BLOOD PRESSURE < 130 MM HG: ICD-10-PCS | Mod: CPTII,S$GLB,, | Performed by: INTERNAL MEDICINE

## 2023-08-25 PROCEDURE — 83970 ASSAY OF PARATHORMONE: CPT | Performed by: INTERNAL MEDICINE

## 2023-08-25 PROCEDURE — 1160F RVW MEDS BY RX/DR IN RCRD: CPT | Mod: CPTII,S$GLB,, | Performed by: INTERNAL MEDICINE

## 2023-08-25 PROCEDURE — 3078F DIAST BP <80 MM HG: CPT | Mod: CPTII,S$GLB,, | Performed by: INTERNAL MEDICINE

## 2023-08-25 PROCEDURE — 1159F MED LIST DOCD IN RCRD: CPT | Mod: CPTII,S$GLB,, | Performed by: INTERNAL MEDICINE

## 2023-08-25 PROCEDURE — 99214 PR OFFICE/OUTPT VISIT, EST, LEVL IV, 30-39 MIN: ICD-10-PCS | Mod: S$GLB,,, | Performed by: INTERNAL MEDICINE

## 2023-08-25 PROCEDURE — 86800 THYROGLOBULIN ANTIBODY: CPT | Performed by: INTERNAL MEDICINE

## 2023-08-25 PROCEDURE — 99214 OFFICE O/P EST MOD 30 MIN: CPT | Mod: S$GLB,,, | Performed by: INTERNAL MEDICINE

## 2023-08-25 PROCEDURE — 1160F PR REVIEW ALL MEDS BY PRESCRIBER/CLIN PHARMACIST DOCUMENTED: ICD-10-PCS | Mod: CPTII,S$GLB,, | Performed by: INTERNAL MEDICINE

## 2023-08-25 PROCEDURE — 3008F PR BODY MASS INDEX (BMI) DOCUMENTED: ICD-10-PCS | Mod: CPTII,S$GLB,, | Performed by: INTERNAL MEDICINE

## 2023-08-25 PROCEDURE — 99999 PR PBB SHADOW E&M-EST. PATIENT-LVL V: CPT | Mod: PBBFAC,,, | Performed by: INTERNAL MEDICINE

## 2023-08-25 PROCEDURE — 80069 RENAL FUNCTION PANEL: CPT | Performed by: INTERNAL MEDICINE

## 2023-08-25 PROCEDURE — 84443 ASSAY THYROID STIM HORMONE: CPT | Performed by: INTERNAL MEDICINE

## 2023-08-25 PROCEDURE — 85027 COMPLETE CBC AUTOMATED: CPT | Performed by: INTERNAL MEDICINE

## 2023-08-25 PROCEDURE — 3074F SYST BP LT 130 MM HG: CPT | Mod: CPTII,S$GLB,, | Performed by: INTERNAL MEDICINE

## 2023-08-25 PROCEDURE — 3078F PR MOST RECENT DIASTOLIC BLOOD PRESSURE < 80 MM HG: ICD-10-PCS | Mod: CPTII,S$GLB,, | Performed by: INTERNAL MEDICINE

## 2023-08-25 PROCEDURE — 1159F PR MEDICATION LIST DOCUMENTED IN MEDICAL RECORD: ICD-10-PCS | Mod: CPTII,S$GLB,, | Performed by: INTERNAL MEDICINE

## 2023-08-25 RX ORDER — LEVOTHYROXINE SODIUM 125 UG/1
125 TABLET ORAL
COMMUNITY
End: 2024-02-05

## 2023-08-25 NOTE — PROGRESS NOTES
CHIEF COMPLAINT:  Papillary thyroid cancer  33 y.o. old being seen as a f/u.  Here with mother.   Had a CT showing multiple lymph nodes.  Patient had FNA suggestive of Hurthle cell neoplasm.  Subsequently had a thyroidectomy on 02/01/2023. She did have LN involvement and strap muscle muscle involvement.  Status post 103 mCi of radioactive iodine on 06/21/2023.  Whole-body scan shows physiologic uptake in the neck.    Status post thyroidectomy.  Currently on Synthroid 125 mcg daily.  Currently on Tums 1 g 2 times daily.  Also on calcitriol 1 mcg daily-solution. No palpitations. No tremors. No cramping. States had some numbness in her hands.             FNA 01/18/2023  RIGHT NECK FINE NEEDLE ASPIRATE   - BLOOD ONLY.     RIGHT THYROID FINE NEEDLE ASPIRATE:     - SUSPICIOUS FOR A FOLLICULAR NEOPLASM:  HURTHLE CELL TYPE.         1.  THYROID GLAND, RIGHT LOBE, LOBECTOMY:        --PAPILLARY THYROID CARCINOMA, CLASSIC (USUAL, CONVENTIONAL) TYPE.        --TUMOR MEASURES 1.6 CENTIMETERS.        --FOCAL LYMPHATIC INVASION IDENTIFIED.        --EXTRATHYROIDAL EXTENSION IDENTIFIED.        --MARGINS ARE NEGATIVE FOR MALIGNANCY.   --BENIGN SUBCAPSULAR/INTRACAPSULAR PARATHYROID GLANDULAR TISSUE    IDENTIFIED.          2.  THYROID GLAND, LEFT LOBE, LOBECTOMY:        --PAPILLARY THYROID CARCINOMA, CLASSIC (USUAL, CONVENTIONAL) TYPE.        --TUMOR MEASURES 1.2 CENTIMETERS.        --FOCAL LYMPHATIC INVASION IDENTIFIED.        --NO EXTRATHYROIDAL EXTENSION IDENTIFIED.        --MARGINS ARE NEGATIVE FOR MALIGNANCY.   --ONE PERITHYROIDAL SPECIMEN LYMPH NODE IS POSITIVE FOR METASTATIC    PAPILLARY THYROID CARCINOMA   (1/1); SIZE OF PART 1 SPECIMEN METASTATIC DEPOSIT: APPROXIMATELY 0.1    CENTIMETER; NO UNEQUIVOCAL           EXTRANODAL EXTENSION IDENTIFIED.     3.  LEFT CENTRAL NECK, EXCISION:   --NINE OUT OF TWELVE LYMPH NODES ARE POSITIVE FOR METASTATIC PAPILLARY    THYROID CARCINOMA (9/12).   --SIZE OF LARGEST PART 3 SPECIMEN METASTATIC  DEPOSIT: APPROXIMATELY 0.6    CENTIMETER.        --FOCAL EXTRANODAL EXTENSION IDENTIFIED.        --THYROID PARENCHYMA WITH NODULAR HYPERPLASIA.        --BENIGN PARATHYROID GLANDULAR TISSUE IDENTIFIED.     4.  RIGHT PARATRACHEAL, EXCISION:   --SIX OUT OF TEN LYMPH NODES ARE POSITIVE FOR METASTATIC PAPILLARY    THYROID CARCINOMA (6/10).   --SIZE OF LARGEST PART 4 SPECIMEN METASTATIC DEPOSIT: APPROXIMATELY 0.7    CENTIMETER.        --NO UNEQUIVOCAL EXTRANODAL EXTENSION IDENTIFIED.        --BENIGN ECTOPIC THYMIC TISSUE IDENTIFIED.     5.  RIGHT NECK CONTENTS, DISSECTION:   --THIRTEEN OUT OF FORTY LYMPH NODES ARE POSITIVE FOR METASTATIC    PAPILLARY THYROID CARCINOMA           (13/40).   --SIZE OF LARGEST PART 5 SPECIMEN METASTATIC DEPOSIT: APPROXIMATELY 1.7    CENTIMETERS.        --EXTRANODAL EXTENSION (MULTIFOCAL) IDENTIFIED.     6.  LEFT NECK CONTENTS, DISSECTION:   --ONE OUT OF EIGHTEEN LYMPH NODES IS POSITIVE FOR METASTATIC PAPILLARY    THYROID CARCINOMA (1/18).   --SIZE OF LARGEST PART 6 SPECIMEN METASTATIC DEPOSIT: LESS THAN 0.1    CENTIMETER.        --NO UNEQUIVOCAL EXTRANODAL EXTENSION IDENTIFIED.        --BENIGN PARATHYROID GLANDULAR TISSUE IDENTIFIED.       PAST MEDICAL HISTORY/PAST SURGICAL HISTORY:  Reviewed in [facility]    SOCIAL HISTORY: [facility]    FAMILY HISTORY:  Father has thyroid nodules and awaiting biopsy. Father with prediabetes.     MEDICATIONS/ALLERGIES: The patient's MedCard has been updated and reviewed.            PE:    GENERAL: Well developed, well nourished.  NECK: Supple, trachea midline, surgical scar intact.  CHEST: Resp even and unlabored, CTA bilateral.  CARDIAC: RRR, S1, S2 heard, no murmurs, rubs, S3, or S4  SKIN: no acanthosis nigracans.    LABS/Radiology       Latest Reference Range & Units 06/20/23 09:45   Thyroglobulin Interpretation  SEE BELOW   Thyroglobulin Antibody Screen <1.8 IU/mL <1.8   Thyroglobulin, Tumor Marker ng/mL 0.9 (H)   (H): Data is abnormally high     Latest  Reference Range & Units 08/04/23 12:52   TSH 0.400 - 4.000 uIU/mL 0.352 (L)   (L): Data is abnormally low      ASSESSMENT/PLAN:  Papillary thyroid cancer-multifocal.  Multiple level lymph node involvement.  Also involvement of strap muscle.  Status post thyroidectomy with bilateral neck dissection.  Status post radioactive iodine 103 mCi 06/21/2023.  Posttreatment whole-body scan showed physiologic uptake in the neck.  Appears there was some confusion regarding the elevated TSH.  However the extremely high TSH was after Thyrogen.  May have only taken the 137 mcg for a few days.  Otherwise has been on 125 mcg.  Can repeat TSH.      2.  Hypothyroidism-see #1.  Monitor closely for hyperthyroid symptoms since will be keeping TSH suppressed.    3. Hypocalcemia-last PTH undetectable.  She did notice some numbness this morning.  Check levels as seen below.  Currently on liquid calcitriol as she is having difficulty swallowing.    FOLLOWUP  Today- TSH, Tg, Renal Panel, PTH, CBC  F/U 6 months- TSH, Tg, Renal Panel, PTH, Thyroid Ultrasound.

## 2023-08-28 LAB
THRYOGLOBULIN INTERPRETATION: ABNORMAL
THYROGLOB AB SERPL-ACNC: <1.8 IU/ML
THYROGLOB SERPL-MCNC: 0.5 NG/ML

## 2024-02-05 RX ORDER — LEVOTHYROXINE SODIUM 125 UG/1
125 TABLET ORAL
Qty: 30 TABLET | Refills: 9 | Status: SHIPPED | OUTPATIENT
Start: 2024-02-05 | End: 2024-02-09 | Stop reason: SDUPTHER

## 2024-02-09 ENCOUNTER — OFFICE VISIT (OUTPATIENT)
Dept: ENDOCRINOLOGY | Facility: CLINIC | Age: 34
End: 2024-02-09
Payer: COMMERCIAL

## 2024-02-09 VITALS
HEIGHT: 67 IN | WEIGHT: 158.19 LBS | HEART RATE: 80 BPM | BODY MASS INDEX: 24.83 KG/M2 | DIASTOLIC BLOOD PRESSURE: 66 MMHG | SYSTOLIC BLOOD PRESSURE: 116 MMHG

## 2024-02-09 DIAGNOSIS — C73 THYROID CANCER: Primary | ICD-10-CM

## 2024-02-09 DIAGNOSIS — E03.9 HYPOTHYROIDISM, UNSPECIFIED TYPE: ICD-10-CM

## 2024-02-09 DIAGNOSIS — E83.51 HYPOCALCEMIA: ICD-10-CM

## 2024-02-09 DIAGNOSIS — D64.9 ANEMIA, UNSPECIFIED TYPE: ICD-10-CM

## 2024-02-09 PROCEDURE — 3008F BODY MASS INDEX DOCD: CPT | Mod: CPTII,S$GLB,, | Performed by: INTERNAL MEDICINE

## 2024-02-09 PROCEDURE — 99214 OFFICE O/P EST MOD 30 MIN: CPT | Mod: S$GLB,,, | Performed by: INTERNAL MEDICINE

## 2024-02-09 PROCEDURE — 1159F MED LIST DOCD IN RCRD: CPT | Mod: CPTII,S$GLB,, | Performed by: INTERNAL MEDICINE

## 2024-02-09 PROCEDURE — 99999 PR PBB SHADOW E&M-EST. PATIENT-LVL IV: CPT | Mod: PBBFAC,,, | Performed by: INTERNAL MEDICINE

## 2024-02-09 PROCEDURE — 3078F DIAST BP <80 MM HG: CPT | Mod: CPTII,S$GLB,, | Performed by: INTERNAL MEDICINE

## 2024-02-09 PROCEDURE — 1160F RVW MEDS BY RX/DR IN RCRD: CPT | Mod: CPTII,S$GLB,, | Performed by: INTERNAL MEDICINE

## 2024-02-09 PROCEDURE — 3074F SYST BP LT 130 MM HG: CPT | Mod: CPTII,S$GLB,, | Performed by: INTERNAL MEDICINE

## 2024-02-09 RX ORDER — LEVOTHYROXINE SODIUM 125 UG/1
125 TABLET ORAL
Qty: 30 TABLET | Refills: 9 | Status: SHIPPED | OUTPATIENT
Start: 2024-02-09 | End: 2024-04-22

## 2024-02-09 NOTE — PROGRESS NOTES
CHIEF COMPLAINT:  Papillary thyroid cancer  33 y.o. old being seen as a f/u.  Here with mother.   Had a CT showing multiple lymph nodes.  Patient had FNA suggestive of Hurthle cell neoplasm.  Subsequently had a thyroidectomy on 02/01/2023. She did have LN involvement and strap muscle muscle involvement.  Status post 103 mCi of radioactive iodine on 06/21/2023.  Whole-body scan shows physiologic uptake in the neck.    Status post thyroidectomy.  Currently on Synthroid 125 mcg daily. No labs done. Currently on Tums 1 g 2 times daily.  Also on calcitriol 1 mcg daily-solution. No cramping. Ocasional paresthesias. Occasional palpitations. Unsure if having panic attacks. Has some hair loss. HHas some alternating sensations of heat/cold. No tremors. No cramping. States had some numbness in her hands.             FNA 01/18/2023  RIGHT NECK FINE NEEDLE ASPIRATE   - BLOOD ONLY.     RIGHT THYROID FINE NEEDLE ASPIRATE:     - SUSPICIOUS FOR A FOLLICULAR NEOPLASM:  HURTHLE CELL TYPE.         1.  THYROID GLAND, RIGHT LOBE, LOBECTOMY:        --PAPILLARY THYROID CARCINOMA, CLASSIC (USUAL, CONVENTIONAL) TYPE.        --TUMOR MEASURES 1.6 CENTIMETERS.        --FOCAL LYMPHATIC INVASION IDENTIFIED.        --EXTRATHYROIDAL EXTENSION IDENTIFIED.        --MARGINS ARE NEGATIVE FOR MALIGNANCY.   --BENIGN SUBCAPSULAR/INTRACAPSULAR PARATHYROID GLANDULAR TISSUE    IDENTIFIED.          2.  THYROID GLAND, LEFT LOBE, LOBECTOMY:        --PAPILLARY THYROID CARCINOMA, CLASSIC (USUAL, CONVENTIONAL) TYPE.        --TUMOR MEASURES 1.2 CENTIMETERS.        --FOCAL LYMPHATIC INVASION IDENTIFIED.        --NO EXTRATHYROIDAL EXTENSION IDENTIFIED.        --MARGINS ARE NEGATIVE FOR MALIGNANCY.   --ONE PERITHYROIDAL SPECIMEN LYMPH NODE IS POSITIVE FOR METASTATIC    PAPILLARY THYROID CARCINOMA   (1/1); SIZE OF PART 1 SPECIMEN METASTATIC DEPOSIT: APPROXIMATELY 0.1    CENTIMETER; NO UNEQUIVOCAL           EXTRANODAL EXTENSION IDENTIFIED.     3.  LEFT CENTRAL NECK,  EXCISION:   --NINE OUT OF TWELVE LYMPH NODES ARE POSITIVE FOR METASTATIC PAPILLARY    THYROID CARCINOMA (9/12).   --SIZE OF LARGEST PART 3 SPECIMEN METASTATIC DEPOSIT: APPROXIMATELY 0.6    CENTIMETER.        --FOCAL EXTRANODAL EXTENSION IDENTIFIED.        --THYROID PARENCHYMA WITH NODULAR HYPERPLASIA.        --BENIGN PARATHYROID GLANDULAR TISSUE IDENTIFIED.     4.  RIGHT PARATRACHEAL, EXCISION:   --SIX OUT OF TEN LYMPH NODES ARE POSITIVE FOR METASTATIC PAPILLARY    THYROID CARCINOMA (6/10).   --SIZE OF LARGEST PART 4 SPECIMEN METASTATIC DEPOSIT: APPROXIMATELY 0.7    CENTIMETER.        --NO UNEQUIVOCAL EXTRANODAL EXTENSION IDENTIFIED.        --BENIGN ECTOPIC THYMIC TISSUE IDENTIFIED.     5.  RIGHT NECK CONTENTS, DISSECTION:   --THIRTEEN OUT OF FORTY LYMPH NODES ARE POSITIVE FOR METASTATIC    PAPILLARY THYROID CARCINOMA           (13/40).   --SIZE OF LARGEST PART 5 SPECIMEN METASTATIC DEPOSIT: APPROXIMATELY 1.7    CENTIMETERS.        --EXTRANODAL EXTENSION (MULTIFOCAL) IDENTIFIED.     6.  LEFT NECK CONTENTS, DISSECTION:   --ONE OUT OF EIGHTEEN LYMPH NODES IS POSITIVE FOR METASTATIC PAPILLARY    THYROID CARCINOMA (1/18).   --SIZE OF LARGEST PART 6 SPECIMEN METASTATIC DEPOSIT: LESS THAN 0.1    CENTIMETER.        --NO UNEQUIVOCAL EXTRANODAL EXTENSION IDENTIFIED.        --BENIGN PARATHYROID GLANDULAR TISSUE IDENTIFIED.       PAST MEDICAL HISTORY/PAST SURGICAL HISTORY:  Reviewed in [facility]    SOCIAL HISTORY: Reviewed in [facility]    FAMILY HISTORY:  Father has thyroid nodules and awaiting biopsy. Father with prediabetes.     MEDICATIONS/ALLERGIES: The patient's MedCard has been updated and reviewed.            PE:    GENERAL: Well developed, well nourished.  NECK: Supple, trachea midline, surgical scar intact.  CHEST: Resp even and unlabored, CTA bilateral.  CARDIAC: RRR, S1, S2 heard, no murmurs, rubs, S3, or S4    LABS/Radiology    US SOFT TISSUE HEAD NECK     CLINICAL HISTORY:  Malignant neoplasm of thyroid gland      TECHNIQUE:  Ultrasound of the thyroid and cervical lymph nodes was performed.     COMPARISON:  None.     FINDINGS:  The thyroid gland is surgically absent.  There is no evidence of residual thyroid tissue.  There are 2 small lymph nodes visualized in the left neck 1 of the nodes measures 0.9 x 0.3 x 0.5 cm.  The other node measures 0.7 x 0.4 x 0.5 cm.     Impression:     The patient is status post total thyroidectomy with no residual thyroid tissue identified.  There are 2 small lymph nodes in the left neck.        ASSESSMENT/PLAN:  Papillary thyroid cancer-multifocal.  Multiple level lymph node involvement.  Also involvement of strap muscle.  Status post thyroidectomy with bilateral neck dissection.  Status post radioactive iodine 103 mCi 06/21/2023.  Posttreatment whole-body scan showed physiologic uptake in the neck.  Thyroid ultrasound shows no concerning findings.  Independently reviewed ultrasound images with the patient.  Lymph nodes have no concerning features.  Check thyroglobulin    2.  Hypothyroidism-see #1.  Monitor closely for hyperthyroid symptoms since will be keeping TSH suppressed.  Check TSH    3. Hypocalcemia-last PTH undetectable.  She did notice some numbness this morning.  Check levels as seen below.  Currently on liquid calcitriol as she is having difficulty swallowing.    4. Anemia-they would like to CBC checked since we are drawing blood.    FOLLOWUP  Today- TSH, Tg, Renal Panel, PTH, CBC  F/U 6 months- TSH, Tg, Renal Panel, PTH

## 2024-02-20 ENCOUNTER — PATIENT MESSAGE (OUTPATIENT)
Dept: ENDOCRINOLOGY | Facility: CLINIC | Age: 34
End: 2024-02-20
Payer: COMMERCIAL

## 2024-04-03 RX ORDER — CALCITRIOL 1 UG/ML
SOLUTION ORAL
Qty: 30 ML | Refills: 11 | Status: SHIPPED | OUTPATIENT
Start: 2024-04-03

## 2024-04-23 ENCOUNTER — PATIENT MESSAGE (OUTPATIENT)
Dept: ENDOCRINOLOGY | Facility: CLINIC | Age: 34
End: 2024-04-23
Payer: COMMERCIAL

## 2024-04-23 NOTE — TELEPHONE ENCOUNTER
TSH mildly suppressed.  For now will keep the same.  If starts getting heart racing, worsening insomnia or worsening anxiety let us know

## 2024-04-29 ENCOUNTER — PATIENT MESSAGE (OUTPATIENT)
Dept: ENDOCRINOLOGY | Facility: CLINIC | Age: 34
End: 2024-04-29
Payer: COMMERCIAL

## 2024-04-29 DIAGNOSIS — C73 THYROID CANCER: Primary | ICD-10-CM

## 2024-04-29 DIAGNOSIS — E03.9 HYPOTHYROIDISM, UNSPECIFIED TYPE: ICD-10-CM

## 2024-04-29 RX ORDER — LEVOTHYROXINE SODIUM 100 UG/1
100 TABLET ORAL
Qty: 30 TABLET | Refills: 11 | Status: SHIPPED | OUTPATIENT
Start: 2024-04-29 | End: 2025-04-29

## 2024-04-29 NOTE — TELEPHONE ENCOUNTER
A slow heart rate would not be from a suppressed TSH.  If having significant chest pain, should go to the emergency room.  Also not sure hand and feet numbness would be related.  That being said, a suppressed TSH can cause increased anxiety    Decrease Synthroid to 100 mcg daily   Check TSH 6 weeks

## 2024-06-07 ENCOUNTER — PATIENT MESSAGE (OUTPATIENT)
Dept: ENDOCRINOLOGY | Facility: CLINIC | Age: 34
End: 2024-06-07
Payer: COMMERCIAL

## 2024-07-01 PROBLEM — E63.9 NUTRITIONAL AND METABOLIC CARDIOMYOPATHY: Status: ACTIVE | Noted: 2024-07-01

## 2024-07-01 PROBLEM — E88.9 NUTRITIONAL AND METABOLIC CARDIOMYOPATHY: Status: ACTIVE | Noted: 2024-07-01

## 2024-07-01 PROBLEM — I43 NUTRITIONAL AND METABOLIC CARDIOMYOPATHY: Status: ACTIVE | Noted: 2024-07-01

## 2024-07-01 PROBLEM — F31.9 BIPOLAR 1 DISORDER: Status: ACTIVE | Noted: 2024-07-01

## 2024-09-06 ENCOUNTER — PATIENT MESSAGE (OUTPATIENT)
Dept: ENDOCRINOLOGY | Facility: CLINIC | Age: 34
End: 2024-09-06
Payer: COMMERCIAL

## 2024-09-09 NOTE — TELEPHONE ENCOUNTER
She was supposed to have followed up after these labs with an appt    TSH mildly suppressed. Ok to keep it where it is.   Tg stable which is good.   Ca at an acceptable level.     But if having issues needs to come in to discuss. OK to double.

## 2024-09-13 ENCOUNTER — OFFICE VISIT (OUTPATIENT)
Dept: ENDOCRINOLOGY | Facility: CLINIC | Age: 34
End: 2024-09-13
Payer: COMMERCIAL

## 2024-09-13 VITALS
HEART RATE: 83 BPM | DIASTOLIC BLOOD PRESSURE: 70 MMHG | BODY MASS INDEX: 28.27 KG/M2 | SYSTOLIC BLOOD PRESSURE: 112 MMHG | OXYGEN SATURATION: 98 % | HEIGHT: 67 IN | WEIGHT: 180.13 LBS

## 2024-09-13 DIAGNOSIS — C73 THYROID CANCER: Primary | ICD-10-CM

## 2024-09-13 DIAGNOSIS — E83.42 HYPOMAGNESEMIA: ICD-10-CM

## 2024-09-13 DIAGNOSIS — E83.51 HYPOCALCEMIA: ICD-10-CM

## 2024-09-13 DIAGNOSIS — E03.9 HYPOTHYROIDISM, UNSPECIFIED TYPE: ICD-10-CM

## 2024-09-13 PROCEDURE — 1159F MED LIST DOCD IN RCRD: CPT | Mod: CPTII,S$GLB,, | Performed by: INTERNAL MEDICINE

## 2024-09-13 PROCEDURE — 3008F BODY MASS INDEX DOCD: CPT | Mod: CPTII,S$GLB,, | Performed by: INTERNAL MEDICINE

## 2024-09-13 PROCEDURE — 3078F DIAST BP <80 MM HG: CPT | Mod: CPTII,S$GLB,, | Performed by: INTERNAL MEDICINE

## 2024-09-13 PROCEDURE — 99214 OFFICE O/P EST MOD 30 MIN: CPT | Mod: S$GLB,,, | Performed by: INTERNAL MEDICINE

## 2024-09-13 PROCEDURE — 99999 PR PBB SHADOW E&M-EST. PATIENT-LVL IV: CPT | Mod: PBBFAC,,, | Performed by: INTERNAL MEDICINE

## 2024-09-13 PROCEDURE — 1160F RVW MEDS BY RX/DR IN RCRD: CPT | Mod: CPTII,S$GLB,, | Performed by: INTERNAL MEDICINE

## 2024-09-13 PROCEDURE — 3074F SYST BP LT 130 MM HG: CPT | Mod: CPTII,S$GLB,, | Performed by: INTERNAL MEDICINE

## 2024-09-13 NOTE — PROGRESS NOTES
CHIEF COMPLAINT:  Papillary thyroid cancer  34 y.o. old being seen as a f/u.  Here with mother.   Had a CT showing multiple lymph nodes.  Patient had FNA suggestive of Hurthle cell neoplasm.  Subsequently had a thyroidectomy on 02/01/2023. She did have LN involvement and strap muscle muscle involvement.  Status post 103 mCi of radioactive iodine on 06/21/2023.  Whole-body scan shows physiologic uptake in the neck.    Status post thyroidectomy.  Currently on Synthroid 100 mcg daily. No labs done. Currently on Tums 1 g 2 times daily.  Also on calcitriol 0.5 mcg daily-solution. States feeling week. She does have anemia. No cramping. No significant palpitations. She does get anxious.             FNA 01/18/2023  RIGHT NECK FINE NEEDLE ASPIRATE   - BLOOD ONLY.     RIGHT THYROID FINE NEEDLE ASPIRATE:     - SUSPICIOUS FOR A FOLLICULAR NEOPLASM:  HURTHLE CELL TYPE.         1.  THYROID GLAND, RIGHT LOBE, LOBECTOMY:        --PAPILLARY THYROID CARCINOMA, CLASSIC (USUAL, CONVENTIONAL) TYPE.        --TUMOR MEASURES 1.6 CENTIMETERS.        --FOCAL LYMPHATIC INVASION IDENTIFIED.        --EXTRATHYROIDAL EXTENSION IDENTIFIED.        --MARGINS ARE NEGATIVE FOR MALIGNANCY.   --BENIGN SUBCAPSULAR/INTRACAPSULAR PARATHYROID GLANDULAR TISSUE    IDENTIFIED.          2.  THYROID GLAND, LEFT LOBE, LOBECTOMY:        --PAPILLARY THYROID CARCINOMA, CLASSIC (USUAL, CONVENTIONAL) TYPE.        --TUMOR MEASURES 1.2 CENTIMETERS.        --FOCAL LYMPHATIC INVASION IDENTIFIED.        --NO EXTRATHYROIDAL EXTENSION IDENTIFIED.        --MARGINS ARE NEGATIVE FOR MALIGNANCY.   --ONE PERITHYROIDAL SPECIMEN LYMPH NODE IS POSITIVE FOR METASTATIC    PAPILLARY THYROID CARCINOMA   (1/1); SIZE OF PART 1 SPECIMEN METASTATIC DEPOSIT: APPROXIMATELY 0.1    CENTIMETER; NO UNEQUIVOCAL           EXTRANODAL EXTENSION IDENTIFIED.     3.  LEFT CENTRAL NECK, EXCISION:   --NINE OUT OF TWELVE LYMPH NODES ARE POSITIVE FOR METASTATIC PAPILLARY    THYROID CARCINOMA (9/12).    --SIZE OF LARGEST PART 3 SPECIMEN METASTATIC DEPOSIT: APPROXIMATELY 0.6    CENTIMETER.        --FOCAL EXTRANODAL EXTENSION IDENTIFIED.        --THYROID PARENCHYMA WITH NODULAR HYPERPLASIA.        --BENIGN PARATHYROID GLANDULAR TISSUE IDENTIFIED.     4.  RIGHT PARATRACHEAL, EXCISION:   --SIX OUT OF TEN LYMPH NODES ARE POSITIVE FOR METASTATIC PAPILLARY    THYROID CARCINOMA (6/10).   --SIZE OF LARGEST PART 4 SPECIMEN METASTATIC DEPOSIT: APPROXIMATELY 0.7    CENTIMETER.        --NO UNEQUIVOCAL EXTRANODAL EXTENSION IDENTIFIED.        --BENIGN ECTOPIC THYMIC TISSUE IDENTIFIED.     5.  RIGHT NECK CONTENTS, DISSECTION:   --THIRTEEN OUT OF FORTY LYMPH NODES ARE POSITIVE FOR METASTATIC    PAPILLARY THYROID CARCINOMA           (13/40).   --SIZE OF LARGEST PART 5 SPECIMEN METASTATIC DEPOSIT: APPROXIMATELY 1.7    CENTIMETERS.        --EXTRANODAL EXTENSION (MULTIFOCAL) IDENTIFIED.     6.  LEFT NECK CONTENTS, DISSECTION:   --ONE OUT OF EIGHTEEN LYMPH NODES IS POSITIVE FOR METASTATIC PAPILLARY    THYROID CARCINOMA (1/18).   --SIZE OF LARGEST PART 6 SPECIMEN METASTATIC DEPOSIT: LESS THAN 0.1    CENTIMETER.        --NO UNEQUIVOCAL EXTRANODAL EXTENSION IDENTIFIED.        --BENIGN PARATHYROID GLANDULAR TISSUE IDENTIFIED.       PAST MEDICAL HISTORY/PAST SURGICAL HISTORY:  Reviewed in [facility]    SOCIAL HISTORY: Reviewed in [facility]    FAMILY HISTORY:  Father has thyroid nodules and awaiting biopsy. Father with prediabetes.     MEDICATIONS/ALLERGIES: The patient's MedCard has been updated and reviewed.            PE:    GENERAL: Well developed, well nourished.  NECK: Supple, trachea midline, surgical scar intact.  CHEST: Resp even and unlabored, CTA bilateral.  CARDIAC: RRR, S1, S2 heard, no murmurs, rubs, S3, or S4    LABS/Radiology     Latest Reference Range & Units 09/06/24 10:51   Sodium 136 - 145 mmol/L 138   Potassium 3.5 - 5.1 mmol/L 4.1   Chloride 95 - 110 mmol/L 103   CO2 22 - 31 mmol/L 24   Anion Gap 5 - 12 mmol/L 11   BUN 7 - 18  mg/dL 17   Creatinine 0.50 - 1.40 mg/dL 0.91   eGFR >60 mL/min/1.73 m^2 >60   Glucose 70 - 110 mg/dL 118 (H)   Calcium 8.4 - 10.2 mg/dL 8.4   Phosphorus Level 2.7 - 4.5 mg/dL 4.0   Albumin 3.5 - 5.2 g/dL 4.4   (H): Data is abnormally high     Latest Reference Range & Units 09/06/24 10:51 09/09/24 10:44   TSH 0.400 - 4.000 uIU/mL 0.301 (L)    T3, Free 2.77 - 5.27 pg/mL  3.38   Free T4 0.78 - 2.19 ng/dL  1.49   THYROGLOBULIN 1.3 - 31.8 ng/mL 0.5 (L)    Thyroglobulin Ab Screen 0.0 - 4.0 IU/mL <0.9    PTH 9.0 - 77.0 pg/mL 7.8 (L)    Thyroglobulin, LC/MS/MS 1.3 - 31.8 ng/mL Not Applicable    (L): Data is abnormally low      ASSESSMENT/PLAN:  Papillary thyroid cancer-multifocal.  Multiple level lymph node involvement.  Also involvement of strap muscle.  Status post thyroidectomy with bilateral neck dissection.  Status post radioactive iodine 103 mCi 06/21/2023.  Posttreatment whole-body scan showed physiologic uptake in the neck.  Last Thyroid ultrasound shows no concerning findings.      2.  Hypothyroidism-see #1.  Monitor closely for hyperthyroid symptoms since will be keeping TSH suppressed.  TSH at an acceptable level.    3. Hypocalcemia-last PTH undetectable.  Calcium levels normal.    4.  Hypomagnesemia-check magnesium      FOLLOWUP  Today- Magnesium  F/U 6 months- TSH, Tg, Renal Panel, PTH, thyroid Ultrasound.

## 2024-09-18 ENCOUNTER — PATIENT MESSAGE (OUTPATIENT)
Dept: ENDOCRINOLOGY | Facility: CLINIC | Age: 34
End: 2024-09-18
Payer: COMMERCIAL

## 2025-03-06 ENCOUNTER — OFFICE VISIT (OUTPATIENT)
Dept: OBSTETRICS AND GYNECOLOGY | Facility: CLINIC | Age: 35
End: 2025-03-06
Payer: MEDICARE

## 2025-03-06 VITALS
HEIGHT: 67 IN | WEIGHT: 194.69 LBS | DIASTOLIC BLOOD PRESSURE: 64 MMHG | SYSTOLIC BLOOD PRESSURE: 122 MMHG | BODY MASS INDEX: 30.56 KG/M2

## 2025-03-06 DIAGNOSIS — D50.9 MICROCYTIC ANEMIA: ICD-10-CM

## 2025-03-06 DIAGNOSIS — Z01.419 ENCOUNTER FOR WELL WOMAN EXAM WITH ROUTINE GYNECOLOGICAL EXAM: Primary | ICD-10-CM

## 2025-03-06 PROCEDURE — 99999 PR PBB SHADOW E&M-EST. PATIENT-LVL III: CPT | Mod: PBBFAC,,,

## 2025-03-06 NOTE — PROGRESS NOTES
HISTORY OF PRESENT ILLNESS:    Geovanna Johnston is a 34 y.o. female, , Patient's last menstrual period was 2025.,  presents for a routine annual exam . She is a new patient to the practice. Last GYN exam at age 17 with pap smear. Reports normal pap smear in the past.  Hx of DANY, receives iron infusion every 3 months. Cycles are monthly and subjectively heavy but reports pad changes twice per day. Denies large clots. No dysmenorrhea. Failed trial of OCPs in the past, psychological side effects   Hx of autism, lives at home with her family    Gardisil?: Yes  Last mammogram:  NL TC 19%  Last colon ca screening:  n/a   SA: not sexually active  Contraception: abstinence.    Sexually transmitted infection risk: very low risk of STD exposure.   Menstrual flow: regular every 28-30 days.  This is the extent of the patient's complaints at this time.     Past Medical History:   Diagnosis Date    Abnormal EKG     Anemia, unspecified     Anxiety     Autism     Cancer     Papillary thyroid CA    Constipation     GERD (gastroesophageal reflux disease)     Thyroid disease        Past Surgical History:   Procedure Laterality Date    ESOPHAGEAL DILATION N/A 2023    Procedure: DILATION, ESOPHAGUS;  Surgeon: Elia Carl MD;  Location: [facility];  Service: Endoscopy;  Laterality: N/A;    ESOPHAGOGASTRODUODENOSCOPY N/A 2021    Procedure: EGD (ESOPHAGOGASTRODUODENOSCOPY);  Surgeon: Willie Ramos MD;  Location: [facility];  Service: Endoscopy;  Laterality: N/A;    ESOPHAGOGASTRODUODENOSCOPY N/A 2023    Procedure: EGD (ESOPHAGOGASTRODUODENOSCOPY);  Surgeon: Elia Carl MD;  Location: [facility];  Service: Endoscopy;  Laterality: N/A;    RADICAL NECK DISSECTION Bilateral 2023    Procedure: DISSECTION, NECK, RADICAL;  Surgeon: Alfredo Weaver MD;  Location: [facility];  Service: ENT;  Laterality: Bilateral;  with central neck dissection and upper mediastinal dissection     THYROIDECTOMY Bilateral 2023    Procedure: THYROIDECTOMY;  Surgeon: Alfredo Weaver MD;  Location: [facility] OR;  Service: ENT;  Laterality: Bilateral;  Possible Bilateral       MEDICATIONS AND ALLERGIES:    Current Medications[1]    Review of patient's allergies indicates:   Allergen Reactions    Cranberry     Kiwi        Family History   Problem Relation Name Age of Onset    Hyperlipidemia Mother Gio     Depression Mother Gio     Skin cancer Mother Gio     Cancer Mother Gio     Hearing loss Mother Gio     Learning disabilities Mother Gio     Hypertension Father Kota     Hyperlipidemia Father Kota     Depression Father Kota     Bipolar disorder Father Kota     Asthma Sister Shy     Depression Sister Shy     Breast cancer Maternal Aunt  55    Breast cancer Maternal Grandmother Gabrielle 68        second dx at 85    Alzheimer's disease Maternal Grandmother Gabrielle     Colon cancer Maternal Grandmother Gabrielle 70    COPD Maternal Grandmother Gabrielle     Hearing loss Maternal Grandmother Gabrielle     Skin cancer Maternal Grandfather      Prostate cancer Maternal Grandfather          80s, mets to bone    Breast cancer Other Mat Great GM 42       Social History[2]    OB History    Para Term  AB Living   0 0 0      SAB IAB Ectopic Multiple Live Births                 COMPREHENSIVE GYN HISTORY:  PAP History: Denies abnormal Paps.  Infection History: Denies STDs. Denies PID.  Benign History: Denies uterine fibroids. Denies ovarian cysts. Denies endometriosis. Denies other conditions.  Cancer History: Denies cervical cancer. Denies uterine cancer or hyperplasia. Denies ovarian cancer. Denies vulvar cancer or pre-cancer. Denies vaginal cancer or pre-cancer. Denies breast cancer. Denies colon cancer.      ROS:  GENERAL: No weight changes. No swelling. No fatigue. No fever.  BREASTS: No pain. No lumps. No discharge.  ABDOMEN: No pain. No nausea. No vomiting. No diarrhea. No constipation.  REPRODUCTIVE: No  "abnormal bleeding. No pelvic pain.   VULVA: No pain. No lesions. No itching.  VAGINA: No relaxation. No itching. No odor. No discharge. No lesions.  URINARY: No incontinence. No nocturia. No frequency. No dysuria.    /64 (Patient Position: Sitting)   Ht 5' 7" (1.702 m)   Wt 88.3 kg (194 lb 10.7 oz)   LMP 02/13/2025   BMI 30.49 kg/m²     PE:  Physical Exam:   Constitutional: She is oriented to person, place, and time. She appears well-developed and well-nourished. No distress.    HENT:   Head: Normocephalic.    Eyes: Pupils are equal, round, and reactive to light.      Pulmonary/Chest: Effort normal. No respiratory distress.        Abdominal: Soft. She exhibits no distension. There is no abdominal tenderness.     Genitourinary:    Inguinal canal, vagina, uterus, right adnexa and left adnexa normal.      Pelvic exam was performed with patient supine.   The external female genitalia was normal.     Labial bartholins normal.There is no rash, tenderness or lesion on the right labia. There is no rash, tenderness or lesion on the left labia. Cervix is normal. Right adnexum displays no mass, no tenderness and no fullness. Left adnexum displays no mass, no tenderness and no fullness. Vagina exhibits no lesion. No erythema, vaginal discharge, tenderness or bleeding in the vagina.    No signs of injury in the vagina.      pap smear completedUterus consistancy normal and Uerus contour normal  Uterus is not enlarged and not tender.           Musculoskeletal: Normal range of motion and moves all extremeties.       Neurological: She is alert and oriented to person, place, and time.    Skin: Skin is warm and dry.    Psychiatric: She has a normal mood and affect. Judgment and thought content normal.        PROCEDURES/ORDERS:  Pap      Assessment/Plan:    Encounter for well woman exam with routine gynecological exam  -     Liquid-Based Pap Smear, Screening  -     HPV High Risk Genotypes, PCR    Microcytic anemia    - " Discussed options for menstrual suppression with IUD, concerned about possible mood side effects. Review MOA  - Offered trial of Lysteda, declines  - Will consider ablation vs hysterectomy and follow up as needed    COUNSELING:  The patient was counseled today on:  -A.C.S. Pap and pelvic exam guidelines, recomendations for yearly mammogram, monthly self breast exams and to follow up with her PCP for other health maintenance.    FOLLOW-UP  annually for WWE.          [1]   Current Outpatient Medications:     busPIRone (BUSPAR) 10 MG tablet, Take 20 mg by mouth every evening., Disp: , Rfl:     clonazePAM (KLONOPIN) 1 MG tablet, Take 1 mg by mouth 2 (two) times daily., Disp: , Rfl:     EScitalopram oxalate (LEXAPRO) 10 MG tablet, Take 10 mg by mouth every morning., Disp: , Rfl:     esomeprazole (NEXIUM) 20 MG capsule, Take 20 mg by mouth before breakfast., Disp: , Rfl:     famotidine (PEPCID) 20 MG tablet, Take 20 mg by mouth every evening., Disp: , Rfl:     lamoTRIgine (LAMICTAL) 200 MG tablet, Take 200 mg by mouth every evening., Disp: , Rfl:     levothyroxine (SYNTHROID) 100 MCG tablet, Take 1 tablet (100 mcg total) by mouth before breakfast., Disp: 30 tablet, Rfl: 11    LINZESS 72 mcg Cap capsule, Take 72 mcg by mouth once daily., Disp: , Rfl:     magnesium carbonate (MAGONATE) 54 mg/5 mL Liqd, Take 20 mLs (216 mg total) by mouth once daily., Disp: 600 mL, Rfl: 0    polyethylene glycol (GLYCOLAX) 17 gram PwPk, Take 17 g by mouth 2 (two) times daily., Disp: , Rfl:     QUEtiapine (SEROQUEL) 25 MG Tab, Take 25 mg by mouth 2 (two) times daily., Disp: , Rfl:     rosuvastatin (CRESTOR) 5 MG tablet, Take 1 tablet (5 mg total) by mouth once daily., Disp: 90 tablet, Rfl: 3    calcitrioL (ROCALTROL) 1 mcg/mL solution, Take 1 mL (1 mcg total) by mouth once daily. (Patient taking differently: 0.5 mcg.), Disp: 30 mL, Rfl: 11    calcium carbonate (TUMS) 200 mg calcium (500 mg) chewable tablet, Take 1 tablet (500 mg total) by  mouth 2 (two) times daily. (Patient taking differently: Take 2 tablets by mouth once daily.), Disp: 60 tablet, Rfl: 11  [2]   Social History  Socioeconomic History    Marital status: Single   Tobacco Use    Smoking status: Never    Smokeless tobacco: Never   Substance and Sexual Activity    Alcohol use: Never    Drug use: Never    Sexual activity: Never   Social History Narrative    ** Merged History Encounter **          Social Drivers of Health     Financial Resource Strain: Patient Declined (3/18/2024)    Overall Financial Resource Strain (CARDIA)     Difficulty of Paying Living Expenses: Patient declined   Food Insecurity: Patient Declined (3/18/2024)    Hunger Vital Sign     Worried About Running Out of Food in the Last Year: Patient declined     Ran Out of Food in the Last Year: Patient declined   Transportation Needs: No Transportation Needs (3/18/2024)    PRAPARE - Transportation     Lack of Transportation (Medical): No     Lack of Transportation (Non-Medical): No   Physical Activity: Unknown (3/18/2024)    Exercise Vital Sign     Days of Exercise per Week: 0 days   Stress: Stress Concern Present (3/18/2024)    [employer] - Occupational Stress Questionnaire     Feeling of Stress : Rather much   Housing Stability: Unknown (3/18/2024)    Housing Stability Vital Sign     Unable to Pay for Housing in the Last Year: Patient declined     Unstable Housing in the Last Year: No

## 2025-03-12 ENCOUNTER — OFFICE VISIT (OUTPATIENT)
Dept: ENDOCRINOLOGY | Facility: CLINIC | Age: 35
End: 2025-03-12
Payer: MEDICARE

## 2025-03-12 VITALS
HEIGHT: 67 IN | DIASTOLIC BLOOD PRESSURE: 70 MMHG | BODY MASS INDEX: 30.71 KG/M2 | OXYGEN SATURATION: 97 % | SYSTOLIC BLOOD PRESSURE: 118 MMHG | WEIGHT: 195.69 LBS | HEART RATE: 72 BPM

## 2025-03-12 DIAGNOSIS — E03.9 HYPOTHYROIDISM, UNSPECIFIED TYPE: Primary | ICD-10-CM

## 2025-03-12 DIAGNOSIS — C73 THYROID CANCER: ICD-10-CM

## 2025-03-12 DIAGNOSIS — E83.51 HYPOCALCEMIA: ICD-10-CM

## 2025-03-12 PROCEDURE — 1159F MED LIST DOCD IN RCRD: CPT | Mod: CPTII,S$GLB,, | Performed by: INTERNAL MEDICINE

## 2025-03-12 PROCEDURE — 3074F SYST BP LT 130 MM HG: CPT | Mod: CPTII,S$GLB,, | Performed by: INTERNAL MEDICINE

## 2025-03-12 PROCEDURE — 3078F DIAST BP <80 MM HG: CPT | Mod: CPTII,S$GLB,, | Performed by: INTERNAL MEDICINE

## 2025-03-12 PROCEDURE — 99214 OFFICE O/P EST MOD 30 MIN: CPT | Mod: S$GLB,,, | Performed by: INTERNAL MEDICINE

## 2025-03-12 PROCEDURE — 1160F RVW MEDS BY RX/DR IN RCRD: CPT | Mod: CPTII,S$GLB,, | Performed by: INTERNAL MEDICINE

## 2025-03-12 PROCEDURE — 99999 PR PBB SHADOW E&M-EST. PATIENT-LVL IV: CPT | Mod: PBBFAC,,, | Performed by: INTERNAL MEDICINE

## 2025-03-12 PROCEDURE — 3008F BODY MASS INDEX DOCD: CPT | Mod: CPTII,S$GLB,, | Performed by: INTERNAL MEDICINE

## 2025-03-12 RX ORDER — LEVOTHYROXINE SODIUM 112 UG/1
112 TABLET ORAL
Qty: 30 TABLET | Refills: 11 | Status: SHIPPED | OUTPATIENT
Start: 2025-03-12 | End: 2026-03-12

## 2025-03-12 RX ORDER — CETIRIZINE HYDROCHLORIDE 10 MG/1
10 TABLET ORAL DAILY
COMMUNITY

## 2025-03-12 NOTE — PROGRESS NOTES
CHIEF COMPLAINT:  Papillary thyroid cancer  34 y.o. old being seen as a f/u.  Here with mother.   Had a CT showing multiple lymph nodes.  Patient had FNA suggestive of Hurthle cell neoplasm.  Subsequently had a thyroidectomy on 02/01/2023. She did have LN involvement and strap muscle muscle involvement.  Status post 103 mCi of radioactive iodine on 06/21/2023.  Whole-body scan shows physiologic uptake in the neck.    Status post thyroidectomy.  Currently on Synthroid 100 mcg daily. No labs done. Currently on Tums 1 g  daily.  Also on calcitriol 0.5 mcg daily-solution. Had palpitations in the past. She does have anemia at [facility]. Scheduled for repeat labs. Has been sleeping more. Has been feeling more cold. Has some diarrhea but on miralax. No cramping.             FNA 01/18/2023  RIGHT NECK FINE NEEDLE ASPIRATE   - BLOOD ONLY.     RIGHT THYROID FINE NEEDLE ASPIRATE:     - SUSPICIOUS FOR A FOLLICULAR NEOPLASM:  HURTHLE CELL TYPE.         1.  THYROID GLAND, RIGHT LOBE, LOBECTOMY:        --PAPILLARY THYROID CARCINOMA, CLASSIC (USUAL, CONVENTIONAL) TYPE.        --TUMOR MEASURES 1.6 CENTIMETERS.        --FOCAL LYMPHATIC INVASION IDENTIFIED.        --EXTRATHYROIDAL EXTENSION IDENTIFIED.        --MARGINS ARE NEGATIVE FOR MALIGNANCY.   --BENIGN SUBCAPSULAR/INTRACAPSULAR PARATHYROID GLANDULAR TISSUE    IDENTIFIED.          2.  THYROID GLAND, LEFT LOBE, LOBECTOMY:        --PAPILLARY THYROID CARCINOMA, CLASSIC (USUAL, CONVENTIONAL) TYPE.        --TUMOR MEASURES 1.2 CENTIMETERS.        --FOCAL LYMPHATIC INVASION IDENTIFIED.        --NO EXTRATHYROIDAL EXTENSION IDENTIFIED.        --MARGINS ARE NEGATIVE FOR MALIGNANCY.   --ONE PERITHYROIDAL SPECIMEN LYMPH NODE IS POSITIVE FOR METASTATIC    PAPILLARY THYROID CARCINOMA   (1/1); SIZE OF PART 1 SPECIMEN METASTATIC DEPOSIT: APPROXIMATELY 0.1    CENTIMETER; NO UNEQUIVOCAL           EXTRANODAL EXTENSION IDENTIFIED.     3.  LEFT CENTRAL NECK, EXCISION:   --NINE OUT OF TWELVE LYMPH  NODES ARE POSITIVE FOR METASTATIC PAPILLARY    THYROID CARCINOMA (9/12).   --SIZE OF LARGEST PART 3 SPECIMEN METASTATIC DEPOSIT: APPROXIMATELY 0.6    CENTIMETER.        --FOCAL EXTRANODAL EXTENSION IDENTIFIED.        --THYROID PARENCHYMA WITH NODULAR HYPERPLASIA.        --BENIGN PARATHYROID GLANDULAR TISSUE IDENTIFIED.     4.  RIGHT PARATRACHEAL, EXCISION:   --SIX OUT OF TEN LYMPH NODES ARE POSITIVE FOR METASTATIC PAPILLARY    THYROID CARCINOMA (6/10).   --SIZE OF LARGEST PART 4 SPECIMEN METASTATIC DEPOSIT: APPROXIMATELY 0.7    CENTIMETER.        --NO UNEQUIVOCAL EXTRANODAL EXTENSION IDENTIFIED.        --BENIGN ECTOPIC THYMIC TISSUE IDENTIFIED.     5.  RIGHT NECK CONTENTS, DISSECTION:   --THIRTEEN OUT OF FORTY LYMPH NODES ARE POSITIVE FOR METASTATIC    PAPILLARY THYROID CARCINOMA           (13/40).   --SIZE OF LARGEST PART 5 SPECIMEN METASTATIC DEPOSIT: APPROXIMATELY 1.7    CENTIMETERS.        --EXTRANODAL EXTENSION (MULTIFOCAL) IDENTIFIED.     6.  LEFT NECK CONTENTS, DISSECTION:   --ONE OUT OF EIGHTEEN LYMPH NODES IS POSITIVE FOR METASTATIC PAPILLARY    THYROID CARCINOMA (1/18).   --SIZE OF LARGEST PART 6 SPECIMEN METASTATIC DEPOSIT: LESS THAN 0.1    CENTIMETER.        --NO UNEQUIVOCAL EXTRANODAL EXTENSION IDENTIFIED.        --BENIGN PARATHYROID GLANDULAR TISSUE IDENTIFIED.       PAST MEDICAL HISTORY/PAST SURGICAL HISTORY:  Reviewed in [place]    SOCIAL HISTORY: Reviewed in [facility]    FAMILY HISTORY:  Father has thyroid nodules and awaiting biopsy. Father with prediabetes.     MEDICATIONS/ALLERGIES: The patient's MedCard has been updated and reviewed.            PE:    GENERAL: Well developed, well nourished.  NECK: Supple, trachea midline, surgical scar intact.  CHEST: Resp even and unlabored, CTA bilateral.  CARDIAC: RRR, S1, S2 heard, no murmurs, rubs, S3, or S4    LABS/Radiology     Latest Reference Range & Units 03/05/25 10:01   Sodium 136 - 145 mmol/L 141   Potassium 3.5 - 5.1 mmol/L 3.6   Chloride 95 - 110 mmol/L  107   CO2 23 - 29 mmol/L 25   Anion Gap 8 - 16 mmol/L 9   BUN 6 - 20 mg/dL 23 (H)   Creatinine 0.50 - 1.40 mg/dL 0.86   eGFR >60 mL/min/1.73 m^2 >60   Glucose 70 - 110 mg/dL 72   Calcium 8.7 - 10.5 mg/dL 8.5 (L)   Phosphorus Level 2.7 - 4.5 mg/dL 3.4   Magnesium  1.6 - 2.6 mg/dL 1.9   (H): Data is abnormally high  (L): Data is abnormally low     Latest Reference Range & Units 03/05/25 10:01   TSH 0.400 - 4.000 uIU/mL 3.026   THYROGLOBULIN 1.3 - 31.8 ng/mL 0.5 (L)   Thyroglobulin Ab Screen 0.0 - 4.0 IU/mL <1.5   PTH 9.0 - 77.0 pg/mL <4.0 (L)   Thyroglobulin, LC/MS/MS 1.3 - 31.8 ng/mL Not Applicable   (L): Data is abnormally low    US SOFT TISSUE HEAD NECK     CLINICAL HISTORY:  Malignant neoplasm of thyroid gland     TECHNIQUE:  Ultrasound of the thyroid and cervical lymph nodes was performed.     COMPARISON:  02/02/2024     FINDINGS:  Patient has had a total thyroidectomy.  No abnormalities are noted in the thyroid bed bilaterally.  There are 2 small lymph nodes in the left neck.  These measure 0.6 x 0.4 x 0.5 cm and 0.9 x 0.5 x 0.7 cm that appear grossly stable from the previous study.     Impression:     No acute abnormalities are demonstrated.    ASSESSMENT/PLAN:  Papillary thyroid cancer-multifocal.  Multiple level lymph node involvement.  Also involvement of strap muscle.  Status post thyroidectomy with bilateral neck dissection.  Status post radioactive iodine 103 mCi 06/21/2023.  Posttreatment whole-body scan showed physiologic uptake in the neck.  Thyroid ultrasound shows no concerning findings.  Thyroglobulin stable.    2.  Hypothyroidism-see #1.  Monitor closely for hyperthyroid symptoms since will be keeping TSH mildly suppressed.  Monitor closely for increased anxiety. Increase Synthroid to 112 mcg    3. Hypocalcemia-last PTH undetectable.  Calcium levels at an acceptable range        FOLLOWUP  TSH 6 weeks  F/U 6 months- TSH, Tg, Renal Panel, PTH

## 2025-03-24 RX ORDER — LEVOTHYROXINE SODIUM 100 UG/1
TABLET ORAL
Qty: 30 TABLET | Refills: 11 | Status: SHIPPED | OUTPATIENT
Start: 2025-03-24

## 2025-03-24 RX ORDER — CALCITRIOL 1 UG/ML
SOLUTION ORAL
Qty: 30 ML | Refills: 11 | Status: SHIPPED | OUTPATIENT
Start: 2025-03-24